# Patient Record
Sex: FEMALE | Race: OTHER | HISPANIC OR LATINO | Employment: UNEMPLOYED | URBAN - METROPOLITAN AREA
[De-identification: names, ages, dates, MRNs, and addresses within clinical notes are randomized per-mention and may not be internally consistent; named-entity substitution may affect disease eponyms.]

---

## 2023-06-15 ENCOUNTER — HOSPITAL ENCOUNTER (OUTPATIENT)
Facility: HOSPITAL | Age: 23
End: 2023-06-15
Attending: OBSTETRICS & GYNECOLOGY | Admitting: OBSTETRICS & GYNECOLOGY

## 2023-06-15 ENCOUNTER — HOSPITAL ENCOUNTER (EMERGENCY)
Facility: HOSPITAL | Age: 23
End: 2023-06-15
Attending: EMERGENCY MEDICINE | Admitting: EMERGENCY MEDICINE

## 2023-06-15 ENCOUNTER — HOSPITAL ENCOUNTER (OUTPATIENT)
Facility: HOSPITAL | Age: 23
Discharge: HOME | End: 2023-06-15
Attending: OBSTETRICS & GYNECOLOGY | Admitting: OBSTETRICS & GYNECOLOGY

## 2023-06-15 VITALS
DIASTOLIC BLOOD PRESSURE: 71 MMHG | TEMPERATURE: 98.8 F | HEART RATE: 71 BPM | SYSTOLIC BLOOD PRESSURE: 118 MMHG | RESPIRATION RATE: 16 BRPM

## 2023-06-15 VITALS
RESPIRATION RATE: 16 BRPM | WEIGHT: 146 LBS | TEMPERATURE: 97.9 F | DIASTOLIC BLOOD PRESSURE: 80 MMHG | SYSTOLIC BLOOD PRESSURE: 127 MMHG | OXYGEN SATURATION: 100 % | HEART RATE: 90 BPM

## 2023-06-15 DIAGNOSIS — R10.9 ABDOMINAL PAIN, UNSPECIFIED ABDOMINAL LOCATION: Primary | ICD-10-CM

## 2023-06-15 DIAGNOSIS — B96.89 BV (BACTERIAL VAGINOSIS): Primary | ICD-10-CM

## 2023-06-15 DIAGNOSIS — K59.00 CONSTIPATION: ICD-10-CM

## 2023-06-15 DIAGNOSIS — Z34.92 PRENATAL CARE IN SECOND TRIMESTER: ICD-10-CM

## 2023-06-15 DIAGNOSIS — N76.0 BV (BACTERIAL VAGINOSIS): Primary | ICD-10-CM

## 2023-06-15 DIAGNOSIS — Z34.92 PREGNANT AND NOT YET DELIVERED IN SECOND TRIMESTER: ICD-10-CM

## 2023-06-15 LAB
BACTERIA UR QL AUTO: ABNORMAL /HPF
BILIRUB UR QL STRIP: NEGATIVE
BILIRUB UR QL STRIP: NEGATIVE
CLARITY UR: ABNORMAL
CLARITY UR: CLEAR
COLOR UR: YELLOW
COLOR UR: YELLOW
GLUCOSE UR STRIP-MCNC: NEGATIVE MG/DL
GLUCOSE UR STRIP-MCNC: NEGATIVE MG/DL
HGB UR QL STRIP.AUTO: NEGATIVE
HGB UR QL STRIP.AUTO: NEGATIVE
KETONES UR STRIP-MCNC: NEGATIVE MG/DL
KETONES UR STRIP-MCNC: NEGATIVE MG/DL
LEUKOCYTE ESTERASE UR QL STRIP: ABNORMAL
LEUKOCYTE ESTERASE UR QL STRIP: ABNORMAL
NITRITE UR QL STRIP: NEGATIVE
NITRITE UR QL STRIP: NEGATIVE
NON-SQ EPI CELLS URNS QL MICRO: ABNORMAL /HPF
PH UR STRIP.AUTO: 7 [PH]
PH UR STRIP.AUTO: 7 [PH] (ref 4.5–8)
PROT UR STRIP-MCNC: NEGATIVE MG/DL
PROT UR STRIP-MCNC: NEGATIVE MG/DL
RBC #/AREA URNS AUTO: ABNORMAL /HPF
SP GR UR STRIP.AUTO: 1.01 (ref 1–1.03)
SP GR UR STRIP.AUTO: 1.02 (ref 1–1.03)
UROBILINOGEN UR QL STRIP.AUTO: 0.2 E.U./DL
UROBILINOGEN UR QL STRIP.AUTO: 0.2 E.U./DL
WBC #/AREA URNS AUTO: ABNORMAL /HPF

## 2023-06-15 PROCEDURE — 76817 TRANSVAGINAL US OBSTETRIC: CPT

## 2023-06-15 PROCEDURE — 87491 CHLMYD TRACH DNA AMP PROBE: CPT

## 2023-06-15 PROCEDURE — 81001 URINALYSIS AUTO W/SCOPE: CPT | Performed by: EMERGENCY MEDICINE

## 2023-06-15 PROCEDURE — 99214 OFFICE O/P EST MOD 30 MIN: CPT

## 2023-06-15 PROCEDURE — 81003 URINALYSIS AUTO W/O SCOPE: CPT | Performed by: EMERGENCY MEDICINE

## 2023-06-15 PROCEDURE — NC001 PR NO CHARGE: Performed by: OBSTETRICS & GYNECOLOGY

## 2023-06-15 PROCEDURE — 87086 URINE CULTURE/COLONY COUNT: CPT | Performed by: EMERGENCY MEDICINE

## 2023-06-15 PROCEDURE — 87591 N.GONORRHOEAE DNA AMP PROB: CPT

## 2023-06-15 RX ORDER — METRONIDAZOLE 500 MG/1
500 TABLET ORAL EVERY 12 HOURS SCHEDULED
Qty: 14 TABLET | Refills: 0 | Status: SHIPPED | OUTPATIENT
Start: 2023-06-15 | End: 2023-06-22

## 2023-06-15 RX ORDER — DOCUSATE SODIUM 100 MG/1
100 CAPSULE, LIQUID FILLED ORAL 2 TIMES DAILY
Qty: 30 CAPSULE | Refills: 2 | Status: SHIPPED | OUTPATIENT
Start: 2023-06-15

## 2023-06-15 NOTE — EMTALA/ACUTE CARE TRANSFER
[facility] EMERGENCY DEPARTMENT  [address] [facility] [address]  Dept: [phone]      EMTALA TRANSFER CONSENT    NAME Arie Hamilton                                         2000                              MRN [id number]    I have been informed of my rights regarding examination, treatment, and transfer   by Dr Mateus Avila MD    Benefits: Specialized equipment and/or services available at the receiving facility (Include comment)________________________ (ob)    Risks: Potential for delay in receiving treatment, Potential deterioration of medical condition, Increased discomfort during transfer      Consent for Transfer:  I acknowledge that my medical condition has been evaluated and explained to me by the emergency department physician or other qualified medical person and/or my attending physician, who has recommended that I be transferred to the service of  Accepting Physician: Dr Radha Alvarez at [address] Name, [address] : José Antonio Ray  The above potential benefits of such transfer, the potential risks associated with such transfer, and the probable risks of not being transferred have been explained to me, and I fully understand them  The doctor has explained that, in my case, the benefits of transfer outweigh the risks  I agree to be transferred  I authorize the performance of emergency medical procedures and treatments upon me in both transit and upon arrival at the receiving facility  Additionally, I authorize the release of any and all medical records to the receiving facility and request they be transported with me, if possible  I understand that the safest mode of transportation during a medical emergency is an ambulance and that the Hospital advocates the use of this mode of transport   Risks of traveling to the receiving facility by car, including absence of medical control, life sustaining equipment, such as oxygen, and medical personnel has been explained to me and I fully understand them  (ANEL CORRECT BOX BELOW)  [  ]  I consent to the stated transfer and to be transported by ambulance/helicopter  [  ]  I consent to the stated transfer, but refuse transportation by ambulance and accept full responsibility for my transportation by car  I understand the risks of non-ambulance transfers and I exonerate the Hospital and its staff from any deterioration in my condition that results from this refusal     X___________________________________________    DATE  06/15/23  TIME________  Signature of patient or legally responsible individual signing on patient behalf           RELATIONSHIP TO PATIENT_________________________          Provider Certification    NAME Jessie Varela                                         2000                              MRN [id number]    A medical screening exam was performed on the above named patient  Based on the examination:    Condition Necessitating Transfer There were no encounter diagnoses  Patient Condition: The patient has been stabilized such that within reasonable medical probability, no material deterioration of the patient condition or the condition of the unborn child(zach) is likely to result from the transfer    Reason for Transfer: Level of Care needed not available at this facility    [facility]   · Space available and qualified personnel available for treatment as acknowledged by    · Agreed to accept transfer and to provide appropriate medical treatment as acknowledged by       Dr Jax Bailey  · Appropriate medical records of the examination and treatment of the patient are provided at the time of transfer   [address] _______  · Transfer will be performed by qualified personnel from    and appropriate transfer equipment as required, including the use of necessary and appropriate life support measures      Provider Certification: I have examined the patient and explained the following risks and benefits of being transferred/refusing transfer to the patient/family:  General risk, such as traffic hazards, adverse weather conditions, rough terrain or turbulence, possible failure of equipment (including vehicle or aircraft), or consequences of actions of persons outside the control of the transport personnel, Unanticipated needs of medical equipment and personnel during transport, Risk of worsening condition      Based on these reasonable risks and benefits to the patient and/or the unborn child(zach), and based upon the information available at the time of the patient’s examination, I certify that the medical benefits reasonably to be expected from the provision of appropriate medical treatments at another medical facility outweigh the increasing risks, if any, to the individual’s medical condition, and in the case of labor to the unborn child, from effecting the transfer      X____________________________________________ DATE 06/15/23        TIME_______      ORIGINAL - SEND TO MEDICAL RECORDS   COPY - SEND WITH PATIENT DURING TRANSFER

## 2023-06-15 NOTE — ED NOTES
Called [facility], spoke with triage RN, gave report, no further questions at this time     Cesar Alves RN  06/15/23 9821

## 2023-06-15 NOTE — PROCEDURES
Jessie Varela, rogerio [id number] at 24w3d with an REGGIE of 10/2/2023, by Patient Reported, was seen at [address] for the following procedure(s): $Procedure Type: US - Transvaginal]                   Ultrasound Other  Cervical Length: 4 04  Funnel: No  Debris: No  Placenta Previa: No  Vasa Previa: No             Ultrasound Probe Disinfection    A transvaginal ultrasound was performed     Prior to use, disinfection was performed with High Level Disinfection Process (Trophon)    Sabiha Hitchcock MD  06/15/23  6:54 PM

## 2023-06-15 NOTE — ED PROVIDER NOTES
History  Chief Complaint   Patient presents with   • Abdominal Pain     Pt reports being 4 months pregnant, started with right lower abdominal pain this morning, denies n/d/v, no difficulty urinating     The patient reports several week history of right sided abdominal pain  She reports the pain was previously just with certain movements like standing up and moving; however, the patient has been happening randomly even when not moving since this morning  The pain comes and goes with complete resolution in between episodes of pain  She notes increased urinary frequency today, but no burning with urination  She denies back or flank pain  She denies fevers or chills  No leg swelling  She reports she has been getting prenatal care in [place] but moved to the area 2 weeks ago but has not seen a local provider yet  History provided by:  Significant other and patient   used: Yes (Patient refused Turkish interpretter and instead preferred to use her significant other for Turkish )        None       History reviewed  No pertinent past medical history  History reviewed  No pertinent surgical history  History reviewed  No pertinent family history  I have reviewed and agree with the history as documented  E-Cigarette/Vaping     E-Cigarette/Vaping Substances     Social History     Tobacco Use   • Smoking status: Never   • Smokeless tobacco: Never       Review of Systems   All other systems reviewed and are negative  Physical Exam  Physical Exam  Vitals and nursing note reviewed  Constitutional:       General: She is not in acute distress  Appearance: She is well-developed  HENT:      Head: Normocephalic and atraumatic  Eyes:      Conjunctiva/sclera: Conjunctivae normal    Cardiovascular:      Rate and Rhythm: Normal rate and regular rhythm  Heart sounds: No murmur heard  Pulmonary:      Effort: Pulmonary effort is normal  No respiratory distress        Breath sounds: Normal breath sounds  Abdominal:      Palpations: Abdomen is soft  Tenderness: There is abdominal tenderness (mild diffuse right sided tenderness most intense over right upper portion of gravid uterus  Gravid uterus with size consistent with dates ) in the right upper quadrant and right lower quadrant  There is no guarding or rebound  Musculoskeletal:         General: No swelling  Cervical back: Neck supple  Skin:     General: Skin is warm and dry  Capillary Refill: Capillary refill takes less than 2 seconds  Neurological:      Mental Status: She is alert  Psychiatric:         Mood and Affect: Mood normal          Vital Signs  ED Triage Vitals   Temperature Pulse Respirations Blood Pressure SpO2   06/15/23 1537 06/15/23 1536 06/15/23 1536 06/15/23 1536 06/15/23 1536   97 9 °F (36 6 °C) 90 16 127/80 100 %      Temp Source Heart Rate Source Patient Position - Orthostatic VS BP Location FiO2 (%)   06/15/23 1537 -- -- -- --   Oral          Pain Score       --                  Vitals:    06/15/23 1536   BP: 127/80   Pulse: 90         Visual Acuity      ED Medications  Medications - No data to display    Diagnostic Studies  Results Reviewed     Procedure Component Value Units Date/Time    UA w Reflex to Microscopic w Reflex to Culture [[id number]]  (Abnormal) Collected: 06/15/23 1607    Lab Status: Final result Specimen: Urine, Clean Catch Updated: 06/15/23 1616     Color, UA Yellow     Clarity, UA Slightly Cloudy     Specific Roxana, UA 1 010     pH, UA 7 0     Leukocytes, UA 2+     Nitrite, UA Negative     Protein, UA Negative mg/dl      Glucose, UA Negative mg/dl      Ketones, UA Negative mg/dl      Urobilinogen, UA 0 2 E U /dl      Bilirubin, UA Negative     Occult Blood, UA Negative     URINE COMMENT --    Urine Microscopic [[id number]] Collected: 06/15/23 1607    Lab Status:  In process Specimen: Urine, Clean Catch Updated: 06/15/23 1616    Urine culture [[id number]] Collected: 06/15/23 1607 Lab Status: In process Specimen: Urine, Clean Catch Updated: 06/15/23 1616                 No orders to display              Procedures  Procedures         ED Course  ED Course as of 06/15/23 1616   Thu Perez 15, 2023   1601 Case discussed with Dr Gladis Prieto who accepted patient for transfer to L&D triage  [id number] Ambulance offered to patient for transfer but they prefer travel by private vehicle  Will transfer patient for further evaluation and treatment  Medical Decision Making  Patient reports several week history of right sided abdominal pain during pregnancy now with increased frequency  No focal RLQ tenderness  Given chronicity and broad area of tenderness, low risk for appendicitis  Will transfer for ob/gyn evaluation due to patient being 24 weeks pregant with abdominal pain  Amount and/or Complexity of Data Reviewed  External Data Reviewed: notes  Details: I reviewed notes form 5/30/2023 from ob/gyn  Patient previously diagnosed with chlamydia during pregnancy and was treated  No other concerns at that time  Discussion of management or test interpretation with external provider(s): Case discussed with Dr Gladis Prieto        Disposition  Final diagnoses:   Abdominal pain, unspecified abdominal location   Pregnant and not yet delivered in second trimester     Time reflects when diagnosis was documented in both MDM as applicable and the Disposition within this note     Time User Action Codes Description Comment    6/15/2023  3:56 PM Saul Mt Add [R10 9] Abdominal pain, unspecified abdominal location     6/15/2023  3:56 PM Saul Mt Add [Z34 92] Pregnant and not yet delivered in second trimester       ED Disposition     ED Disposition   Transfer to Another Facility-In Network    Condition   --    Date/Time   Thu Perez 15, 2023  3:54 PM    Comment   Abhinav Denis should be transferred out to [facility] L&D Triage             MD Documentation    Flowsheet Row Most Recent Value   Patient Condition The patient has been stabilized such that within reasonable medical probability, no material deterioration of the patient condition or the condition of the unborn child(zach) is likely to result from the transfer   Reason for Transfer Level of Care needed not available at this facility   Benefits of Transfer Specialized equipment and/or services available at the receiving facility (Include comment)________________________  [ob]   Risks of Transfer Potential for delay in receiving treatment, Potential deterioration of medical condition, Increased discomfort during transfer   Accepting Physician Jeffrey Lewis MD, Dr   Provider Certification General risk, such as traffic hazards, adverse weather conditions, rough terrain or turbulence, possible failure of equipment (including vehicle or aircraft), or consequences of actions of persons outside the control of the transport personnel, Unanticipated needs of medical equipment and personnel during transport, Risk of worsening condition      RN Documentation    72 RuJeffrey Vaz      Follow-up Information     Follow up With Specialties Details Why Contact Info    OB Gyn Labor and Delivery Triage    [address], [address] [place] [address]          Patient's Medications    No medications on file       No discharge procedures on file      PDMP Review     None          ED Provider  Electronically Signed by           Ashley Cid MD  06/15/23 9616

## 2023-06-15 NOTE — PROGRESS NOTES
L&D Triage Note - OB/GYN  Daren Johnson 20 y o  female MRN: [id number]  Unit/Bed#:  TRIAGE 3- Encounter: [id number]      ASSESSMENT:    Daren Johnson is a 20 y o  Edmond Heading at 24w3d who presents for right sided abdominal pain  Pain is consistent with round-ligament pain  Wet mount positive for Clue Cells  Patient recently treated for Chlamydia with negative HEMANTH  Partner was not treated  PLAN:    1) r/o PTL   - Abdomen soft, nontender  - Speculum examination: cervical os is closed, no vaginal bleeding or pooling noted, white-yellow vaginal discharge noted  - Nitrazine negative, Slides positive for clue cells, negative for hyphae, trichomonads on microscopy  - TVUS revealed 4 04 cm CL  - UA positive for trace leuks, will follow-up urine culture  - Metronidazole 500mg BID x 7 days sent to pharmacy    2) Chlamydia  - GC/CT sent; will call patient with results  - Doxy 100mg BID x7days sent to pharmacy for partner  - Patient advised not to have intercourse until both treated    3) Continue routine prenatal care  - Patient recently moved from [address] [facility] for [address] [facility] provided to patient, advised to make an appointment in 2 weeks  - 28 week labs sent    4) Discharge from [facility] triage with  labor precautions    - Reviewed rupture of membranes, false vs true labor, decreased fetal movement, and vaginal bleeding   - Pt to call provider with any concerns   - Case discussed with Dr Martin Brought:    Daren Johnson 20 y o  Edmond Heading at 24w3d with an Estimated Date of Delivery: 10/2/23 who presents with right sided abdominal pain  She reports that when she was stepping over a low fence, she felt a pulling sensation on her right side  She has continued to feel a stabbing sensation approximately every 20 minutes  She reports mild vaginal irritation  She denies contractions, LOF, vaginal bleeding, and endorses good fetal movement       She previously received care in [place], but moved to [place] 2 weeks ago  She is looking to transfer her care       Her current obstetrical history is significant for single IUP    Her past obstetrical history is significant for one prior term vaginal delivery in [place]    Contractions: no  Leakage of fluid: no  Vaginal Bleeding: no  Fetal movement: present    OBJECTIVE:    Vitals:    06/15/23 1700   BP: 118/71   Pulse: 71   Resp: 16   Temp:        ROS:  Constitutional: Negative for fevers, chills, headaches, vision changes  Respiratory: Negative for shortness of breath, cough  Cardiovascular: Negative for chest pain, palpitations, lower extremity edema    Gastrointestinal: Negative for nausea, vomiting, diarrhea, constipation  :  Negative for dysuria, hematuria  EXTR:  Negative for rash, new myalgias/arthralgias, joint swelling      General Physical Exam:  General: in no apparent distress and well developed and well nourished  Cardiovascular: Cor RRR  Lungs: non-labored breathing  Abdomen: abdomen is soft without significant tenderness, masses, organomegaly or guarding  Lower extremeties: nontender    Cervical Exam  Speculum: Cervical os is not visibly dilated, no bleeding or lesions, white-yellow discharge from cervical os   SVE: 0 / 0% / -4    Fetal monitoring:  FHT:  135 bpm/ Moderate 6 - 25 bpm / 15 x 15 accelerations present, no decelerations  [place]: contractions not present     KOH/WTMT:     Infection:   - Positive clue cells    - no hyphae   - no trichomonads present    Membrane status   - no ferning   - no nitrazene   - no pooling     Urine Dip    - pos for trace leuks    Imaging:       TVUS   - Cervical length    - 4 07cm    - 4 10cm    - 4 04cm      Mian Garnett MD,  OBGYN PGY-1  6/15/2023 6:20 PM

## 2023-06-16 LAB
C TRACH DNA SPEC QL NAA+PROBE: NEGATIVE
N GONORRHOEA DNA SPEC QL NAA+PROBE: NEGATIVE

## 2023-06-17 LAB — BACTERIA UR CULT: NORMAL

## 2023-06-29 ENCOUNTER — APPOINTMENT (OUTPATIENT)
Dept: LAB | Facility: CLINIC | Age: 23
End: 2023-06-29

## 2023-06-29 ENCOUNTER — INITIAL PRENATAL (OUTPATIENT)
Dept: OBGYN CLINIC | Facility: CLINIC | Age: 23
End: 2023-06-29

## 2023-06-29 DIAGNOSIS — Z34.93 PRENATAL CARE IN THIRD TRIMESTER: Primary | ICD-10-CM

## 2023-06-29 DIAGNOSIS — Z34.92 PRENATAL CARE IN SECOND TRIMESTER: ICD-10-CM

## 2023-06-29 LAB
ERYTHROCYTE [DISTWIDTH] IN BLOOD BY AUTOMATED COUNT: 14.5 % (ref 11.6–15.1)
GLUCOSE 1H P 50 G GLC PO SERPL-MCNC: 156 MG/DL (ref 40–134)
HCT VFR BLD AUTO: 33.6 % (ref 34.8–46.1)
HGB BLD-MCNC: 11.1 G/DL (ref 11.5–15.4)
MCH RBC QN AUTO: 29.4 PG (ref 26.8–34.3)
MCHC RBC AUTO-ENTMCNC: 33 G/DL (ref 31.4–37.4)
MCV RBC AUTO: 89 FL (ref 82–98)
PLATELET # BLD AUTO: 205 THOUSANDS/UL (ref 149–390)
PMV BLD AUTO: 12.7 FL (ref 8.9–12.7)
RBC # BLD AUTO: 3.78 MILLION/UL (ref 3.81–5.12)
TREPONEMA PALLIDUM IGG+IGM AB [PRESENCE] IN SERUM OR PLASMA BY IMMUNOASSAY: NORMAL
WBC # BLD AUTO: 8.99 THOUSAND/UL (ref 4.31–10.16)

## 2023-06-29 PROCEDURE — 82950 GLUCOSE TEST: CPT

## 2023-06-29 PROCEDURE — 85027 COMPLETE CBC AUTOMATED: CPT

## 2023-06-29 PROCEDURE — 36415 COLL VENOUS BLD VENIPUNCTURE: CPT

## 2023-06-29 PROCEDURE — 86780 TREPONEMA PALLIDUM: CPT

## 2023-06-29 RX ORDER — ACETAMINOPHEN 325 MG/1
650 TABLET ORAL EVERY 6 HOURS PRN
COMMUNITY

## 2023-06-29 NOTE — PROGRESS NOTES
OB INTAKE INTERVIEW  Pt presents for OB intake    OB History    Para Term  AB Living   2 1 1   0 1   SAB IAB Ectopic Multiple Live Births   0 0 0 0 1      # Outcome Date GA Lbr Torrey/2nd Weight Sex Delivery Anes PTL Lv   2 Current            1 Term 2018    M Vag-Spont   PAGE     Hx of  delivery prior to 36 weeks 6 days:  No   If yes, place a referral for cervical surveillance at 16 weeks  Last Menstrual Period:    Patient's last menstrual period was 2022 (within months)  Ultrasound date: early pn care done in [place]  All records available in epic      Estimated Date of Delivery: 10/2/23   Confirmed by LMP or US    H&P visit scheduled  2023      Last pap smear: unknown date    Findings; lab pap smear results: no abnormalities    Current Issues:  Constipation :   Yes  Headaches :   No  Cramping:  No  Spotting :   No  PICA cravings :  No    FOB Involved:   Yes  Planned pregnancy:  Yes    I have these concerns about this prenatal patient:   Late transfer from [place]  All lab results/US available in [facility]  28 week labs ordered when patient was in L&D-- slips printed and patient to complete at end of intake  Intake done with use of   Interview education    • Baby and Me Handout  • Baby and Me [facility] Handout  • [facility] MFM Handout  • Discussed genetic testing  • Prenatal lab work: Scripts printed and given to pt  • Influenza vaccine given today: No  • Discussed Tdap vaccine  Immunizations:   Immunization History   Administered Date(s) Administered   • Influenza Injectable, MDCK, Preservative Free, Quadrivalent, 0 5 mL 2023   • Tdap 07/10/2018     Depression Screening Follow-up Plan: Patient's depression screening was negative with an [place] score of  0  Clinically patient does not have depression  No treatment is required             Infection Screening: Does the pt have a hx of MRSA?  No  If yes- please follow MRSA protocol and obtain a nasal swab for MRSA culture    The patient was oriented to our practice and all questions were answered    Interviewed by: Kyara Sultana RN 06/29/23

## 2023-06-29 NOTE — LETTER
[facility] Letter    Sabrina Caraballo  2000  [address] [address]       06/29/23          Sabrina Caraballo is a patient and under our care in our office  Yasmin Guido's Estimated Date of Delivery: 10/2/23  Any questions or concerns feel free to contact our office       Thank you,    Aurea  [id number] [facility]/[address]  [address] [facility]/[place]  [phone]   [facility]/[address]  [phone]

## 2023-06-29 NOTE — LETTER
Proof of Pregnancy Letter    Joaquim Mohr  2000  [address] [address]        06/29/23      Joaquim Mohr is a patient at our facility  Joaquim Onur Estimated Date of Delivery: 10/2/23       Any questions or concerns, please feel free to contact our office      Sincerely,     [facility]   [address],   [place], [address] [place] [address]   [phone]

## 2023-06-29 NOTE — LETTER
Work Letter    Chon Shelton  2000  [place] [address]    Dear Chon Shelton,      06/29/23        Your employee is a patient at [facility]  We recommend that all pregnant women:    1  Have a well-ventilated workspace  2  Wear low-heeled shoes  3  Work no more than 40 hours per week  4  Have a 15 minute break every 2 hours and at least 30 minutes for a meal break  5  Use good body mechanics by bending at your knees to avoid back strain and lift no more than 20 pounds without assistance  Will need assistance with lifting over 20 lbs  6  Have ready access to bathrooms and water  She may continue to work until her due date unless medical complications arise  We anticipate she may return to work in 6-8 weeks after delivery       Sincerely,    [facility]  [place] [address] [place] [address], [address] [place] [address] [place]  [phone]

## 2023-06-30 ENCOUNTER — TELEPHONE (OUTPATIENT)
Facility: HOSPITAL | Age: 23
End: 2023-06-30

## 2023-06-30 ENCOUNTER — TELEPHONE (OUTPATIENT)
Dept: PERINATAL CARE | Facility: OTHER | Age: 23
End: 2023-06-30

## 2023-06-30 NOTE — TELEPHONE ENCOUNTER
Called patient to schedule MFM appointment, based on referral issued to Maternal Fetal Medicine by [facility] office  Left voicemail with  requesting patient to call back and schedule appointment, with office number for return call [phone]

## 2023-07-03 ENCOUNTER — TELEPHONE (OUTPATIENT)
Facility: HOSPITAL | Age: 23
End: 2023-07-03

## 2023-07-03 ENCOUNTER — PATIENT OUTREACH (OUTPATIENT)
Dept: OBGYN CLINIC | Facility: CLINIC | Age: 23
End: 2023-07-03

## 2023-07-03 DIAGNOSIS — Z34.93 PRENATAL CARE IN THIRD TRIMESTER: ICD-10-CM

## 2023-07-03 DIAGNOSIS — R73.09 GLUCOSE TOLERANCE TEST ABNORMAL: Primary | ICD-10-CM

## 2023-07-03 NOTE — PROGRESS NOTES
CAILIN NGUYEN spoke with 21 y/o-S-G2:P1-  Salvadorean speaking woman for prenatal assessment. Pt resides with FOB and 3 y/o son. Pt reported pregnancy was planned and both are happy. Pt denies any usage of drug, alcohol, smoking. Pt denies any Mental Health or Domestic Violence Hx. Pt is applying for MA and WIC. Pt is a  and denies financial concerns. Pt denies transportation issues. Pt reported FOB and extended family are very supportive. Pt denies any questions or concerns at this time. CAILIN NGUYEN explained her role and provided contact information. Pt was encouraged to contact at any time needed.

## 2023-07-03 NOTE — TELEPHONE ENCOUNTER
Moved PT appt to an open spot for an individual visit. Called PT w/ and she is aware of the changes to her appt.

## 2023-07-03 NOTE — RESULT ENCOUNTER NOTE
Please inform patient of elevated 1hr GTT result. I have ordered a 3 hour glucose test for her to complete. Thank you!

## 2023-07-03 NOTE — TELEPHONE ENCOUNTER
----- Message from Heath Christopher sent at 7/3/2023  9:38 AM EDT -----  Regarding: Reschedule pt  Patient is Vincentian speaking and needs individual appointment. She's currently in the 2nd trimester class - Can someone please contact her to reschedule? Thanks!

## 2023-07-05 ENCOUNTER — TELEPHONE (OUTPATIENT)
Dept: OBGYN CLINIC | Facility: CLINIC | Age: 23
End: 2023-07-05

## 2023-07-05 NOTE — TELEPHONE ENCOUNTER
----- Message from Payam Arora MD sent at 7/3/2023  1:48 PM EDT -----  Please inform patient of elevated 1hr GTT result. I have ordered a 3 hour glucose test for her to complete. Thank you!

## 2023-07-13 ENCOUNTER — ROUTINE PRENATAL (OUTPATIENT)
Dept: OBGYN CLINIC | Facility: CLINIC | Age: 23
End: 2023-07-13

## 2023-07-13 VITALS
WEIGHT: 155 LBS | SYSTOLIC BLOOD PRESSURE: 113 MMHG | HEIGHT: 64 IN | DIASTOLIC BLOOD PRESSURE: 77 MMHG | BODY MASS INDEX: 26.46 KG/M2 | RESPIRATION RATE: 18 BRPM | HEART RATE: 76 BPM

## 2023-07-13 DIAGNOSIS — O99.810 ABNORMAL GLUCOSE TOLERANCE AFFECTING PREGNANCY, ANTEPARTUM: ICD-10-CM

## 2023-07-13 DIAGNOSIS — Z34.93 PRENATAL CARE IN THIRD TRIMESTER: Primary | ICD-10-CM

## 2023-07-13 RX ORDER — FAMOTIDINE 20 MG/1
20 TABLET, FILM COATED ORAL 2 TIMES DAILY
COMMUNITY
Start: 2023-03-30 | End: 2024-03-29

## 2023-07-13 RX ORDER — AZITHROMYCIN 250 MG/1
1000 TABLET, FILM COATED ORAL
COMMUNITY
Start: 2023-03-08

## 2023-07-13 NOTE — PROGRESS NOTES
Using  [phone], the patient presents at 28 weeks 3 days gestation for routine prenatal exam.  She has no complaints related to the pregnancy other than occasional hardness which she notices on palpation of her abdomen. There is no pattern to the hardness and does not feel like labor pain. She is made aware that if the tightening in her abdomen becomes anything other than random she should report to [facility] for evaluation. She denies decreased fetal movement, vaginal bleeding, loss of fluid, contractions, or pain. Consents were signed for delivery. She has occasional shortness of breath when ambulating on stairs, denies fever, chills, cough or any other accompanying symptoms. She had a history of a positive chlamydia test in the first trimester and was tested for reinfection at the end of May with a negative result, noted in care everywhere from [address] [place] [address]. We will see her back in 2 weeks or as needed.

## 2023-07-23 NOTE — PROGRESS NOTES
Please refer to the [facility] ultrasound report in Ob Procedures for additional information regarding today's visit

## 2023-07-24 ENCOUNTER — ROUTINE PRENATAL (OUTPATIENT)
Dept: PERINATAL CARE | Facility: OTHER | Age: 23
End: 2023-07-24

## 2023-07-24 VITALS
HEART RATE: 70 BPM | HEIGHT: 61 IN | DIASTOLIC BLOOD PRESSURE: 68 MMHG | WEIGHT: 153.6 LBS | BODY MASS INDEX: 29 KG/M2 | SYSTOLIC BLOOD PRESSURE: 118 MMHG

## 2023-07-24 DIAGNOSIS — Z36.4 ULTRASOUND FOR ANTENATAL SCREENING FOR FETAL GROWTH RESTRICTION: Primary | ICD-10-CM

## 2023-07-24 DIAGNOSIS — Z3A.30 30 WEEKS GESTATION OF PREGNANCY: ICD-10-CM

## 2023-07-24 DIAGNOSIS — Z34.93 PRENATAL CARE IN THIRD TRIMESTER: ICD-10-CM

## 2023-07-24 PROCEDURE — 99243 OFF/OP CNSLTJ NEW/EST LOW 30: CPT | Performed by: OBSTETRICS & GYNECOLOGY

## 2023-07-24 PROCEDURE — 76816 OB US FOLLOW-UP PER FETUS: CPT | Performed by: OBSTETRICS & GYNECOLOGY

## 2023-07-24 NOTE — LETTER
2023     [address] [facility] PROVIDER    Patient: Argentina Purcell   YOB: 2000   Date of Visit: 2023       Dear  Provider: Thank you for referring Argentina Purcell to me for evaluation. Below are my notes for this consultation. If you have questions, please do not hesitate to call me. I look forward to following your patient along with you.          Sincerely,         US 1 MO        CC: No Recipients    Yuko Zurita MD  2023  1:54 PM  Sign when Signing Visit  Please refer to the [facility] ultrasound report in Ob Procedures for additional information regarding today's visit

## 2023-07-24 NOTE — LETTER
July 24, 2023     [address] [facility] PROVIDER    Patient: Eduard Aguirre   YOB: 2000   Date of Visit: 7/24/2023       Dear  Provider: Thank you for referring Eduard Aguirre to me for evaluation. Below are my notes for this consultation. If you have questions, please do not hesitate to call me. I look forward to following your patient along with you.          Sincerely,        Shan Vincent MD        CC: No Recipients    Shan Vincent MD  7/23/2023  1:54 PM  Sign when Signing Visit  Please refer to the [facility] ultrasound report in Ob Procedures for additional information regarding today's visit

## 2023-07-24 NOTE — PATIENT INSTRUCTIONS
Conteo de patadas en el embarazo   LO QUE NECESITA SABER:   El conteo de patadas mide cuánto se está moviendo gutierres bebé en el útero. Baljinder patada de gutierres bebé podría sentirse denae baljinder torcedura, baljinder vuelta, un crujido, un meneo o un golpe. Es común sentir a tu bebé patear a las 32 a 29 semanas de [place]. Es posible que sienta al bebé patear ya a las 20 semanas de [place]. Puede que desee empezar a contar a las 28 semanas. INSTRUCCIONES SOBRE [facility]:   Comuníquese con gutierres médico de inmediato si:  Usted siente un cambio en el número de patadas o movimientos de gutierres bebé. Siente menos de 10 patadas en 2 horas. Usted tiene preguntas o inquietudes acerca de los movimientos de gutierres bebé. Por qué realizar el conteo de patadas: Los movimientos de gutierres bebé podrían proporcionar información de la montrell de gutierres bebé. Es posible que si hay problemas, gutierres bebé se mueva menos o nada en lo absoluto. El bebé podría moverse menos si no recibe suficiente oxígeno o alimento de la placenta. No fume mientras está embarazada. Fumar disminuye la cantidad de oxígeno que llega a gutierres bebé. Hable con gutierres médico si necesita ayuda para dejar de fumar. Los problemas que se encuentran en baljinder etapa más temprana son más fáciles de tratar. Cuándo realizar el conteo de patadas:  Cuente las patadas en el mismo horario todos los [place]. Realice el conteo de las patadas cuando gutierres bebé esté despierto y [place]. Gutierres bebé podría estar más activo en la tarde. Cómo realizar el conteo de patadas: Revise que gutierres bebé esté despierto antes de realizar el conteo de patadas. Usted puede despertar a gutierres bebé empujando gutierres estómago suavemente, caminando o tomando algo frío. Gutierres médico podría indicarle diferentes maneras de realizar el conteo. Es posible que le indique que wilfrido lo siguiente:  Use baljinder gráfica o un reloj para mantener un registro de la hora en que comienza y termina de contar.     Siéntese en baljinder silla o acuéstese en gutierres costado derecho. Coloque yaya kusum en la parte más stiven de gutierres [place]. Cuente hasta que llegue a las 10 patadas. Escriba cuánto tiempo le lleva contar las 10 patadas. Podría rakesh de 30 minutos a 2 horas para contar 10 patadas. No debería de rakesh más de 2 horas para contar 10 patadas. Acuda a la consulta de control con gutierres médico según las indicaciones: Anote yaya preguntas para que se acuerde de hacerlas soha yaya visitas. © Copyright [facility], [place] 2022 Information is for End User's use only and may not be sold, redistributed or otherwise used for commercial purposes. Esta información es sólo para uso en educación. Gutierres intención no es darle un consejo médico sobre enfermedades o tratamientos. Colsulte con gutierres Sinai Marchi farmacéutico antes de seguir cualquier régimen médico para saber si es seguro y efectivo para usted.

## 2023-07-27 ENCOUNTER — ROUTINE PRENATAL (OUTPATIENT)
Dept: OBGYN CLINIC | Facility: CLINIC | Age: 23
End: 2023-07-27

## 2023-07-27 VITALS
DIASTOLIC BLOOD PRESSURE: 77 MMHG | HEIGHT: 64 IN | SYSTOLIC BLOOD PRESSURE: 113 MMHG | BODY MASS INDEX: 25.95 KG/M2 | WEIGHT: 152 LBS | HEART RATE: 78 BPM | RESPIRATION RATE: 18 BRPM

## 2023-07-27 DIAGNOSIS — Z3A.30 30 WEEKS GESTATION OF PREGNANCY: ICD-10-CM

## 2023-07-27 DIAGNOSIS — A74.9 CHLAMYDIA INFECTION AFFECTING PREGNANCY IN SECOND TRIMESTER: ICD-10-CM

## 2023-07-27 DIAGNOSIS — Z34.92 PRENATAL CARE, SECOND TRIMESTER: ICD-10-CM

## 2023-07-27 DIAGNOSIS — O98.812 CHLAMYDIA INFECTION AFFECTING PREGNANCY IN SECOND TRIMESTER: ICD-10-CM

## 2023-07-27 DIAGNOSIS — Z23 NEED FOR VACCINATION: Primary | ICD-10-CM

## 2023-07-27 DIAGNOSIS — R73.09 ELEVATED GLUCOSE TOLERANCE TEST: ICD-10-CM

## 2023-07-27 PROCEDURE — 90715 TDAP VACCINE 7 YRS/> IM: CPT | Performed by: OBSTETRICS & GYNECOLOGY

## 2023-07-27 PROCEDURE — 99213 OFFICE O/P EST LOW 20 MIN: CPT | Performed by: OBSTETRICS & GYNECOLOGY

## 2023-07-27 PROCEDURE — 90471 IMMUNIZATION ADMIN: CPT | Performed by: OBSTETRICS & GYNECOLOGY

## 2023-07-27 NOTE — PROGRESS NOTES
[facility]  Name: Nieves Horta  MRN: [id number]  : 2000      ASSESSMENT/PLAN:  Problem List        Other    30 weeks gestation of pregnancy    Overview     -Prenatal labs wnl from prior   -Gonorrhea/chlamydia screening neg  -Level II US wnl  -Delivery plan is   -Delivery consent signed  -Contraception plan is Nexplanon  -Vaccinations: s/p tdap  -Return to office in 2 weeks           Prenatal care, second trimester    Overview     Transfer of care from [facility]           Elevated glucose tolerance test    Overview     1h GTT elevated  3h GTT ordered - pt reminded to complete         Chlamydia infection affecting pregnancy in second trimester    Overview     S/p treatment and negative HEMANTH        Other Visit Diagnoses     Need for vaccination    -  Primary          SUBJECTIVE 20 y.o. Tamsen Hash 30w3d here for PN visit. She denies contractions. She denies leakage of fluid and vaginal bleeding. She reports good fetal movement.      OBJECTIVE:  Vitals:    23 0810   BP: 113/77   Pulse: 78   Resp: 18       Physical Exam    Fundal height: 30 cm   FHT: 134 bpm       Future Appointments   Date Time Provider [address]   8/10/2023  8:15 AM Lexii Stevenson [facility] Charmel Hatchet [facility]         Carrie Salcedo MD  OB/GYN PGY-4  2023  8:56 AM

## 2023-08-09 PROBLEM — Z3A.32 32 WEEKS GESTATION OF PREGNANCY: Status: ACTIVE | Noted: 2023-07-27

## 2023-08-09 PROBLEM — Z34.93 PRENATAL CARE, THIRD TRIMESTER: Status: ACTIVE | Noted: 2023-07-27

## 2023-08-10 ENCOUNTER — ROUTINE PRENATAL (OUTPATIENT)
Dept: OBGYN CLINIC | Facility: CLINIC | Age: 23
End: 2023-08-10

## 2023-08-10 VITALS
HEIGHT: 64 IN | BODY MASS INDEX: 26.56 KG/M2 | DIASTOLIC BLOOD PRESSURE: 76 MMHG | WEIGHT: 155.6 LBS | SYSTOLIC BLOOD PRESSURE: 114 MMHG

## 2023-08-10 DIAGNOSIS — Z34.93 PRENATAL CARE, THIRD TRIMESTER: Primary | ICD-10-CM

## 2023-08-10 DIAGNOSIS — Z3A.32 32 WEEKS GESTATION OF PREGNANCY: ICD-10-CM

## 2023-08-10 DIAGNOSIS — Z78.9 LANGUAGE BARRIER: ICD-10-CM

## 2023-08-10 PROBLEM — Z75.8 LANGUAGE BARRIER: Status: ACTIVE | Noted: 2023-08-10

## 2023-08-10 PROBLEM — Z60.3 LANGUAGE BARRIER: Status: ACTIVE | Noted: 2023-08-10

## 2023-08-10 PROCEDURE — 99213 OFFICE O/P EST LOW 20 MIN: CPT | Performed by: NURSE PRACTITIONER

## 2023-08-10 NOTE — PROGRESS NOTES
[facility]  OB/GYN prenatal visit    S: 20 y.o. Blane Sleet here for PN visit. She has no obstetric complaints, including pelvic pain, contractions, vaginal bleeding, loss of fluid, or decreased fetal movement. She reports HA's and night, does not drink enough of water and drinks a lot of juice. She denies any visual changes.   Needs to complete her  3 hr GTT    O:  Vitals:    08/10/23 0821   BP: 114/76       Gen: no acute distress, nonlabored breathing  Fundal Height (cm): 33 cm  Fetal Heart Rate: 142    A/P:      IUP at 32w2d  No obstetric complaints today   hr gTT 156, needs to complete 3 hr GTT, hgb 11.1  US 23, EFW 26 %, no further US  Discussed  labor precautions and fetal kick counts    Return to clinic in 2 weeks    Problem List        Other    32 weeks gestation of pregnancy    Overview     -Prenatal labs wnl from prior   -Gonorrhea/chlamydia screening neg  -Level II US wnl  1 hr - needs to complete 3 hr GTT  -Delivery plan is   -Delivery consent signed  -Contraception plan is Nexplanon  -Vaccinations: s/p tdap  -Return to office in 2 weeks           Prenatal care, third trimester    Overview     Transfer [facility] care from [facility]           Elevated glucose tolerance test    Overview     1h GTT elevated  3h GTT ordered - pt reminded to complete         Chlamydia infection affecting pregnancy in second trimester    Overview     S/p treatment and negative HEMANTH                JOE Goins  8/10/2023  8:54 AM

## 2023-08-14 ENCOUNTER — HOSPITAL ENCOUNTER (OUTPATIENT)
Facility: HOSPITAL | Age: 23
Discharge: HOME | End: 2023-08-14
Attending: OBSTETRICS & GYNECOLOGY | Admitting: OBSTETRICS & GYNECOLOGY

## 2023-08-14 VITALS
HEART RATE: 79 BPM | OXYGEN SATURATION: 100 % | RESPIRATION RATE: 16 BRPM | TEMPERATURE: 98.1 F | SYSTOLIC BLOOD PRESSURE: 112 MMHG | DIASTOLIC BLOOD PRESSURE: 71 MMHG

## 2023-08-14 DIAGNOSIS — K59.00 CONSTIPATION DURING PREGNANCY: Primary | ICD-10-CM

## 2023-08-14 DIAGNOSIS — O99.619 CONSTIPATION DURING PREGNANCY: Primary | ICD-10-CM

## 2023-08-14 PROBLEM — Z3A.33 33 WEEKS GESTATION OF PREGNANCY: Status: ACTIVE | Noted: 2023-07-27

## 2023-08-14 PROCEDURE — NC001 PR NO CHARGE: Performed by: STUDENT IN AN ORGANIZED HEALTH CARE EDUCATION/TRAINING PROGRAM

## 2023-08-14 PROCEDURE — 87491 CHLMYD TRACH DNA AMP PROBE: CPT | Performed by: OBSTETRICS & GYNECOLOGY

## 2023-08-14 PROCEDURE — 99213 OFFICE O/P EST LOW 20 MIN: CPT

## 2023-08-14 PROCEDURE — 87591 N.GONORRHOEAE DNA AMP PROB: CPT | Performed by: OBSTETRICS & GYNECOLOGY

## 2023-08-14 PROCEDURE — 76817 TRANSVAGINAL US OBSTETRIC: CPT | Performed by: OBSTETRICS & GYNECOLOGY

## 2023-08-14 RX ORDER — POLYETHYLENE GLYCOL 3350 17 G/17G
17 POWDER, FOR SOLUTION ORAL DAILY
Qty: 10 EACH | Refills: 0 | Status: SHIPPED | OUTPATIENT
Start: 2023-08-14

## 2023-08-14 NOTE — PROGRESS NOTES
Triage Note - OB  Fredrick Younger 20 y.o. female MRN: [id number]  Unit/Bed#:  TRIAGE 2-01 Encounter: [id number]    OB TRIAGE NOTE  Fredrick Younger  [id number]  2023  9:22 AM  LD TRIAGE 2/LD TRIAGE 2-*    ASSESS:  20 y.o.  33w0d with abdominal/pelvic pressure. PLAN  #1. Abdominal/pelvic pressure: r/o PTL  · Constant downward pressure since last night  · Denies VB, LOF; endorses good FM  · Has not had bowel movement since Friday  · TVUS cervical length: 2.52 cm  · Chlamydia/GC: collected  · SVE: 3   · Betamethasone was considered but given this patient will likely not deliver within a week, she was given strict return precautions. #2. IUP at 65G2U  · Complicated by chlamydia infection; Neg HEMANTH 6/15/2023  · Re-swab collected today  · 1 hr GTT elevated (156); needs to complete 3 hr     Discharge instructions  · Patient instructed to call if experiencing worsening contractions, vaginal bleeding, loss of fluid or decreased fetal movement. · Will follow up with OBGYN on 2023. D/w Dr. Milena Thibodeaux  ______________    SUBJECTIVE    REGGIE: 10/2/23    HPI Chronology:  20 y.o.  33w0d presents with complaint of constant downward abdominal and pelvic pressure since last night. Denies vaginal bleeding, loss of fluid; endorses good fetal movement. States that the pressure/pain can worsen at times, although she is not sure how often it feels worse or anything that makes it worse or better. She has not recently had intercourse. Has not had a bowel movement since Friday. Contractions: yes; irregular  Leakage: no  Bleeding: no  Fetal Movement: good/active  Pelvic pain: constant pressure    Vitals:   /71   Pulse 79   Temp 98.1 °F (36.7 °C) (Oral)   Resp 16   LMP 2022 (Within Months)   SpO2 100%   There is no height or weight on file to calculate BMI. Review of Systems   Constitutional: Negative for chills and fever. Eyes: Negative for visual disturbance.    Respiratory: Negative for chest tightness and shortness of breath. Cardiovascular: Negative for chest pain, palpitations and leg swelling. Gastrointestinal: Positive for abdominal pain. Genitourinary: Positive for pelvic pain (pressure). Negative for vaginal bleeding and vaginal pain. Neurological: Negative for headaches. Physical Exam  Vitals reviewed. Constitutional:       Appearance: Normal appearance. HENT:      Head: Normocephalic. Cardiovascular:      Rate and Rhythm: Normal rate. Pulmonary:      Effort: Pulmonary effort is normal.   Abdominal:      Tenderness: There is no abdominal tenderness. There is no guarding. Genitourinary:     Comments: External genitalia normal appearing; physiologic discharge present in vaginal canal; cervix visualized and normal appearing; no lacerations or lesions noted; no pooling of fluid noted  SVE: 1/70/-3 per Dr. Royal Gamboa  Skin:     General: Skin is warm and dry. Neurological:      Mental Status: She is alert and oriented to person, place, and time. Psychiatric:         Mood and Affect: Mood normal.         Behavior: Behavior normal.         Thought Content: Thought content normal.         Judgment: Judgment normal.         FHT:  Baseline: 120 bpm  Variability: moderate  Accelerations: present   Decelerations: absent     TOCO: irregular       Labs: No results found for this or any previous visit (from the past 24 hour(s)). Microscopic Slides: negative for ferning, clue cells, hyphae     Imaging:  TVUS: Cervical Length   -2.57 cm   -2.66 cm   -2.52 cm   -2.31 cm with suprapubic pressure  Fetal presentation: vertex      Lul Gamboa, [address] [facility] Gynecology PGY-4  8/14/2023  9:22 AM

## 2023-08-14 NOTE — PROCEDURES
rogerio Gutierrez [id number] at 33w0d with an REGGIE of 10/2/2023, by Patient Reported, was seen at [address] [place] [address] for the following procedure(s): $Procedure Type: US - Transvaginal]                   Ultrasound Other  Fetal Presentation: Vertex  Cervical Length: 2.52  Funnel: No  Placenta Previa: No  Vasa Previa: No        Maria De Jesus Thomas DO  08/14/23  7:58 AM

## 2023-08-15 LAB
C TRACH DNA SPEC QL NAA+PROBE: NEGATIVE
N GONORRHOEA DNA SPEC QL NAA+PROBE: NEGATIVE

## 2023-08-24 ENCOUNTER — ROUTINE PRENATAL (OUTPATIENT)
Dept: OBGYN CLINIC | Facility: CLINIC | Age: 23
End: 2023-08-24

## 2023-08-24 VITALS
BODY MASS INDEX: 26.63 KG/M2 | HEART RATE: 60 BPM | RESPIRATION RATE: 18 BRPM | HEIGHT: 64 IN | SYSTOLIC BLOOD PRESSURE: 119 MMHG | DIASTOLIC BLOOD PRESSURE: 75 MMHG | WEIGHT: 156 LBS

## 2023-08-24 DIAGNOSIS — Z34.93 PRENATAL CARE, THIRD TRIMESTER: Primary | ICD-10-CM

## 2023-08-24 DIAGNOSIS — R73.09 ELEVATED GLUCOSE TOLERANCE TEST: ICD-10-CM

## 2023-08-24 DIAGNOSIS — J45.909 ASTHMA DURING PREGNANCY: ICD-10-CM

## 2023-08-24 DIAGNOSIS — Z3A.34 34 WEEKS GESTATION OF PREGNANCY: ICD-10-CM

## 2023-08-24 DIAGNOSIS — A74.9 CHLAMYDIA INFECTION AFFECTING PREGNANCY IN SECOND TRIMESTER: ICD-10-CM

## 2023-08-24 DIAGNOSIS — O99.519 ASTHMA DURING PREGNANCY: ICD-10-CM

## 2023-08-24 DIAGNOSIS — O98.812 CHLAMYDIA INFECTION AFFECTING PREGNANCY IN SECOND TRIMESTER: ICD-10-CM

## 2023-08-24 PROCEDURE — 99213 OFFICE O/P EST LOW 20 MIN: CPT | Performed by: OBSTETRICS & GYNECOLOGY

## 2023-08-24 RX ORDER — ALBUTEROL SULFATE 90 UG/1
2 AEROSOL, METERED RESPIRATORY (INHALATION) EVERY 6 HOURS PRN
Qty: 8.5 G | Refills: 0 | Status: SHIPPED | OUTPATIENT
Start: 2023-08-24

## 2023-08-24 NOTE — PROGRESS NOTES
OB/GYN prenatal visit    [employer]  #[id number] used for visit translation    S: 20 y.o.  34w3d here for PN visit. She denies contractions, vaginal bleeding, loss of fluid, or decreased fetal movement. Was seen at L&D triage  for r/o PTL, SVE /-3. She is still reporting similar "downward pressure" that she was reporting at triage. Reports increased urinary frequency and constipation. Worsening shortness of breath over the last few days worse from baseline. She reports she feels like she is "choking" and has a lot of coughing. States her mother told her she had childhood asthma but has not been evaluated or treated since  O:  Vitals:    23 0855   BP: 119/75   Pulse: 60   Resp: 18       Blood Pressure: 119/75  Wt=70.8 kg (156 lb); Body mass index is 26.78 kg/m².; TWG=Not found. Fetal Heart Rate: 145; Fundal Height (cm): 34 cm     Gen: no acute distress, nonlabored breathing  Lungs: CTAB, no wheezing  Abdomen: gravid, nontender  SVE /-3  Fundal Height (cm): 34 cm  Fetal Heart Rate: 145      A/P:    Problem List Items Addressed This Visit        Respiratory    Asthma during pregnancy     Possible childhood asthma not currently using inhaler  Albuterol ventolin inhaler prn  Reviewed precautions - worsening SOB, choking feeling that would warrant ED evaluation         Relevant Medications    albuterol (ProAir HFA) 90 mcg/act inhaler       Other    34 weeks gestation of pregnancy     Patient still reporting pelvic pressure. SVE /-3, unchanged from prior on 23  No further ultrasounds indicated at this time  Contraception: Nexplanon  Discussed  labor precautions and fetal kick counts    Return to clinic in 2 weeks           Prenatal care, third trimester - Primary    Elevated glucose tolerance test     Reminded again to complete 3h gtt.  Reviewed lab locations         Chlamydia infection affecting pregnancy in second trimester     S/p treatment and negative HEMANTH, collected 8/14/23            Ysabel Yang MD  8/24/2023  9:49 AM

## 2023-08-24 NOTE — ASSESSMENT & PLAN NOTE
Patient still reporting pelvic pressure.  SVE /-3, unchanged from prior on 23  No further ultrasounds indicated at this time  Contraception: Nexplanon  Discussed  labor precautions and fetal kick counts    Return to clinic in 2 weeks

## 2023-08-24 NOTE — ASSESSMENT & PLAN NOTE
Possible childhood asthma not currently using inhaler  Albuterol ventolin inhaler prn  Reviewed precautions - worsening SOB, choking feeling that would warrant ED evaluation

## 2023-09-05 ENCOUNTER — APPOINTMENT (EMERGENCY)
Dept: RADIOLOGY | Facility: HOSPITAL | Age: 23
End: 2023-09-05
Payer: COMMERCIAL

## 2023-09-05 ENCOUNTER — APPOINTMENT (OUTPATIENT)
Dept: LAB | Facility: CLINIC | Age: 23
End: 2023-09-05
Payer: COMMERCIAL

## 2023-09-05 ENCOUNTER — HOSPITAL ENCOUNTER (OUTPATIENT)
Facility: HOSPITAL | Age: 23
Setting detail: OBSERVATION
Discharge: HOME | End: 2023-09-06
Attending: OBSTETRICS & GYNECOLOGY | Admitting: OBSTETRICS & GYNECOLOGY
Payer: COMMERCIAL

## 2023-09-05 DIAGNOSIS — A41.9 SEPSIS (HCC): ICD-10-CM

## 2023-09-05 DIAGNOSIS — R07.9 ACUTE CHEST PAIN: ICD-10-CM

## 2023-09-05 DIAGNOSIS — Z3A.30 30 WEEKS GESTATION OF PREGNANCY: ICD-10-CM

## 2023-09-05 DIAGNOSIS — R50.9 FEVER: Primary | ICD-10-CM

## 2023-09-05 LAB
ABO GROUP BLD: NORMAL
ALBUMIN SERPL BCP-MCNC: 3.6 G/DL (ref 3.5–5)
ALP SERPL-CCNC: 184 U/L (ref 34–104)
ALT SERPL W P-5'-P-CCNC: 9 U/L (ref 7–52)
ANION GAP SERPL CALCULATED.3IONS-SCNC: 6 MMOL/L
APTT PPP: 29 SECONDS (ref 23–37)
AST SERPL W P-5'-P-CCNC: 17 U/L (ref 13–39)
BACTERIA UR QL AUTO: ABNORMAL /HPF
BASOPHILS # BLD MANUAL: 0 THOUSAND/UL (ref 0–0.1)
BASOPHILS NFR MAR MANUAL: 0 % (ref 0–1)
BILIRUB SERPL-MCNC: 0.37 MG/DL (ref 0.2–1)
BILIRUB UR QL STRIP: NEGATIVE
BLD GP AB SCN SERPL QL: NEGATIVE
BUN SERPL-MCNC: 5 MG/DL (ref 5–25)
CALCIUM SERPL-MCNC: 8.5 MG/DL (ref 8.4–10.2)
CARDIAC TROPONIN I PNL SERPL HS: <2 NG/L
CHLORIDE SERPL-SCNC: 109 MMOL/L (ref 96–108)
CLARITY UR: CLEAR
CO2 SERPL-SCNC: 20 MMOL/L (ref 21–32)
COLOR UR: YELLOW
CREAT SERPL-MCNC: 0.49 MG/DL (ref 0.6–1.3)
CREAT UR-MCNC: 41.5 MG/DL
EOSINOPHIL # BLD MANUAL: 0 THOUSAND/UL (ref 0–0.4)
EOSINOPHIL NFR BLD MANUAL: 0 % (ref 0–6)
ERYTHROCYTE [DISTWIDTH] IN BLOOD BY AUTOMATED COUNT: 14.7 % (ref 11.6–15.1)
FLUAV RNA RESP QL NAA+PROBE: NEGATIVE
FLUBV RNA RESP QL NAA+PROBE: NEGATIVE
GFR SERPL CREATININE-BSD FRML MDRD: 137 ML/MIN/1.73SQ M
GLUCOSE 1H P 100 G GLC PO SERPL-MCNC: 158 MG/DL (ref 65–179)
GLUCOSE 2H P 100 G GLC PO SERPL-MCNC: 111 MG/DL (ref 65–154)
GLUCOSE 3H P 100 G GLC PO SERPL-MCNC: 102 MG/DL (ref 65–139)
GLUCOSE P FAST SERPL-MCNC: 88 MG/DL (ref 65–94)
GLUCOSE SERPL-MCNC: 107 MG/DL (ref 65–140)
GLUCOSE UR STRIP-MCNC: NEGATIVE MG/DL
HCT VFR BLD AUTO: 34.5 % (ref 34.8–46.1)
HGB BLD-MCNC: 11.3 G/DL (ref 11.5–15.4)
HGB UR QL STRIP.AUTO: NEGATIVE
INR PPP: 1 (ref 0.84–1.19)
KETONES UR STRIP-MCNC: NEGATIVE MG/DL
LACTATE SERPL-SCNC: 1.3 MMOL/L (ref 0.5–2)
LEUKOCYTE ESTERASE UR QL STRIP: ABNORMAL
LYMPHOCYTES # BLD AUTO: 0.32 THOUSAND/UL (ref 0.6–4.47)
LYMPHOCYTES # BLD AUTO: 3 % (ref 14–44)
MCH RBC QN AUTO: 28.4 PG (ref 26.8–34.3)
MCHC RBC AUTO-ENTMCNC: 32.8 G/DL (ref 31.4–37.4)
MCV RBC AUTO: 87 FL (ref 82–98)
MONOCYTES # BLD AUTO: 0.53 THOUSAND/UL (ref 0–1.22)
MONOCYTES NFR BLD: 5 % (ref 4–12)
MUCOUS THREADS UR QL AUTO: ABNORMAL
NEUTROPHILS # BLD MANUAL: 9.83 THOUSAND/UL (ref 1.85–7.62)
NEUTS SEG NFR BLD AUTO: 92 % (ref 43–75)
NITRITE UR QL STRIP: NEGATIVE
NON-SQ EPI CELLS URNS QL MICRO: ABNORMAL /HPF
PH UR STRIP.AUTO: 6.5 [PH]
PLATELET # BLD AUTO: 141 THOUSANDS/UL (ref 149–390)
PLATELET BLD QL SMEAR: ABNORMAL
PMV BLD AUTO: 12.6 FL (ref 8.9–12.7)
POTASSIUM SERPL-SCNC: 3.6 MMOL/L (ref 3.5–5.3)
PROCALCITONIN SERPL-MCNC: 0.09 NG/ML
PROT SERPL-MCNC: 6.6 G/DL (ref 6.4–8.4)
PROT UR STRIP-MCNC: ABNORMAL MG/DL
PROT UR-MCNC: 5 MG/DL
PROT/CREAT UR: 0.12 MG/G{CREAT} (ref 0–0.1)
PROTHROMBIN TIME: 13.8 SECONDS (ref 11.6–14.5)
RBC # BLD AUTO: 3.98 MILLION/UL (ref 3.81–5.12)
RBC #/AREA URNS AUTO: ABNORMAL /HPF
RBC MORPH BLD: NORMAL
RH BLD: POSITIVE
RSV RNA RESP QL NAA+PROBE: NEGATIVE
SARS-COV-2 RNA RESP QL NAA+PROBE: NEGATIVE
SODIUM SERPL-SCNC: 135 MMOL/L (ref 135–147)
SP GR UR STRIP.AUTO: 1.02 (ref 1–1.03)
SPECIMEN EXPIRATION DATE: NORMAL
UROBILINOGEN UR STRIP-ACNC: <2 MG/DL
WBC # BLD AUTO: 10.68 THOUSAND/UL (ref 4.31–10.16)
WBC #/AREA URNS AUTO: ABNORMAL /HPF

## 2023-09-05 PROCEDURE — 80053 COMPREHEN METABOLIC PANEL: CPT | Performed by: EMERGENCY MEDICINE

## 2023-09-05 PROCEDURE — 83605 ASSAY OF LACTIC ACID: CPT | Performed by: EMERGENCY MEDICINE

## 2023-09-05 PROCEDURE — 86850 RBC ANTIBODY SCREEN: CPT | Performed by: OBSTETRICS & GYNECOLOGY

## 2023-09-05 PROCEDURE — 71046 X-RAY EXAM CHEST 2 VIEWS: CPT

## 2023-09-05 PROCEDURE — 82570 ASSAY OF URINE CREATININE: CPT

## 2023-09-05 PROCEDURE — 99215 OFFICE O/P EST HI 40 MIN: CPT

## 2023-09-05 PROCEDURE — 99284 EMERGENCY DEPT VISIT MOD MDM: CPT

## 2023-09-05 PROCEDURE — 85007 BL SMEAR W/DIFF WBC COUNT: CPT | Performed by: EMERGENCY MEDICINE

## 2023-09-05 PROCEDURE — 93005 ELECTROCARDIOGRAM TRACING: CPT

## 2023-09-05 PROCEDURE — 86900 BLOOD TYPING SEROLOGIC ABO: CPT | Performed by: OBSTETRICS & GYNECOLOGY

## 2023-09-05 PROCEDURE — 87150 DNA/RNA AMPLIFIED PROBE: CPT | Performed by: OBSTETRICS & GYNECOLOGY

## 2023-09-05 PROCEDURE — NC001 PR NO CHARGE: Performed by: OBSTETRICS & GYNECOLOGY

## 2023-09-05 PROCEDURE — 86901 BLOOD TYPING SEROLOGIC RH(D): CPT | Performed by: OBSTETRICS & GYNECOLOGY

## 2023-09-05 PROCEDURE — 87086 URINE CULTURE/COLONY COUNT: CPT | Performed by: PHYSICIAN ASSISTANT

## 2023-09-05 PROCEDURE — 0241U HB NFCT DS VIR RESP RNA 4 TRGT: CPT | Performed by: EMERGENCY MEDICINE

## 2023-09-05 PROCEDURE — 84484 ASSAY OF TROPONIN QUANT: CPT | Performed by: EMERGENCY MEDICINE

## 2023-09-05 PROCEDURE — 99285 EMERGENCY DEPT VISIT HI MDM: CPT | Performed by: EMERGENCY MEDICINE

## 2023-09-05 PROCEDURE — 82952 GTT-ADDED SAMPLES: CPT

## 2023-09-05 PROCEDURE — 84145 PROCALCITONIN (PCT): CPT | Performed by: EMERGENCY MEDICINE

## 2023-09-05 PROCEDURE — 85730 THROMBOPLASTIN TIME PARTIAL: CPT | Performed by: EMERGENCY MEDICINE

## 2023-09-05 PROCEDURE — 76815 OB US LIMITED FETUS(S): CPT

## 2023-09-05 PROCEDURE — 85610 PROTHROMBIN TIME: CPT | Performed by: EMERGENCY MEDICINE

## 2023-09-05 PROCEDURE — 82951 GLUCOSE TOLERANCE TEST (GTT): CPT

## 2023-09-05 PROCEDURE — 96365 THER/PROPH/DIAG IV INF INIT: CPT

## 2023-09-05 PROCEDURE — 96375 TX/PRO/DX INJ NEW DRUG ADDON: CPT

## 2023-09-05 PROCEDURE — 96361 HYDRATE IV INFUSION ADD-ON: CPT

## 2023-09-05 PROCEDURE — 36415 COLL VENOUS BLD VENIPUNCTURE: CPT

## 2023-09-05 PROCEDURE — 96360 HYDRATION IV INFUSION INIT: CPT

## 2023-09-05 PROCEDURE — 84156 ASSAY OF PROTEIN URINE: CPT

## 2023-09-05 PROCEDURE — 81001 URINALYSIS AUTO W/SCOPE: CPT | Performed by: PHYSICIAN ASSISTANT

## 2023-09-05 PROCEDURE — 87040 BLOOD CULTURE FOR BACTERIA: CPT | Performed by: EMERGENCY MEDICINE

## 2023-09-05 PROCEDURE — 85027 COMPLETE CBC AUTOMATED: CPT | Performed by: EMERGENCY MEDICINE

## 2023-09-05 RX ORDER — METOCLOPRAMIDE HYDROCHLORIDE 5 MG/ML
10 INJECTION INTRAMUSCULAR; INTRAVENOUS EVERY 6 HOURS PRN
Status: DISCONTINUED | OUTPATIENT
Start: 2023-09-05 | End: 2023-09-06 | Stop reason: HOSPADM

## 2023-09-05 RX ORDER — ACETAMINOPHEN 325 MG/1
325 TABLET ORAL EVERY 6 HOURS PRN
Status: DISCONTINUED | OUTPATIENT
Start: 2023-09-05 | End: 2023-09-05

## 2023-09-05 RX ORDER — SODIUM CHLORIDE 9 MG/ML
INJECTION, SOLUTION INTRAVENOUS CONTINUOUS
Status: CANCELLED | OUTPATIENT
Start: 2023-09-05

## 2023-09-05 RX ORDER — SODIUM CHLORIDE 9 MG/ML
125 INJECTION, SOLUTION INTRAVENOUS CONTINUOUS
Status: DISCONTINUED | OUTPATIENT
Start: 2023-09-05 | End: 2023-09-05

## 2023-09-05 RX ORDER — CALCIUM CARBONATE 500 MG/1
1000 TABLET, CHEWABLE ORAL DAILY PRN
Status: DISCONTINUED | OUTPATIENT
Start: 2023-09-05 | End: 2023-09-06 | Stop reason: HOSPADM

## 2023-09-05 RX ORDER — ONDANSETRON 2 MG/ML
4 INJECTION INTRAMUSCULAR; INTRAVENOUS EVERY 8 HOURS PRN
Status: DISCONTINUED | OUTPATIENT
Start: 2023-09-05 | End: 2023-09-06 | Stop reason: HOSPADM

## 2023-09-05 RX ORDER — ACETAMINOPHEN 325 MG/1
650 TABLET ORAL EVERY 4 HOURS PRN
Status: DISCONTINUED | OUTPATIENT
Start: 2023-09-05 | End: 2023-09-06

## 2023-09-05 RX ORDER — METOCLOPRAMIDE HYDROCHLORIDE 5 MG/ML
10 INJECTION INTRAMUSCULAR; INTRAVENOUS ONCE
Status: COMPLETED | OUTPATIENT
Start: 2023-09-05 | End: 2023-09-05

## 2023-09-05 RX ORDER — SIMETHICONE 80 MG
80 TABLET,CHEWABLE ORAL 4 TIMES DAILY PRN
Status: DISCONTINUED | OUTPATIENT
Start: 2023-09-05 | End: 2023-09-06 | Stop reason: HOSPADM

## 2023-09-05 RX ORDER — ACETAMINOPHEN 325 MG/1
650 TABLET ORAL ONCE
Status: COMPLETED | OUTPATIENT
Start: 2023-09-05 | End: 2023-09-05

## 2023-09-05 RX ORDER — SODIUM CHLORIDE 9 MG/ML
125 INJECTION, SOLUTION INTRAVENOUS CONTINUOUS
Status: DISCONTINUED | OUTPATIENT
Start: 2023-09-05 | End: 2023-09-06 | Stop reason: HOSPADM

## 2023-09-05 RX ADMIN — SODIUM CHLORIDE 125 ML/HR: 0.9 INJECTION, SOLUTION INTRAVENOUS at 20:24

## 2023-09-05 RX ADMIN — SODIUM CHLORIDE 1000 ML: 0.9 INJECTION, SOLUTION INTRAVENOUS at 16:10

## 2023-09-05 RX ADMIN — METOCLOPRAMIDE HYDROCHLORIDE 10 MG: 5 INJECTION INTRAMUSCULAR; INTRAVENOUS at 16:16

## 2023-09-05 RX ADMIN — ACETAMINOPHEN 650 MG: 325 TABLET, FILM COATED ORAL at 16:16

## 2023-09-05 RX ADMIN — SODIUM CHLORIDE 1000 ML: 0.9 INJECTION, SOLUTION INTRAVENOUS at 23:05

## 2023-09-05 RX ADMIN — ACETAMINOPHEN 650 MG: 325 TABLET, FILM COATED ORAL at 21:42

## 2023-09-05 RX ADMIN — SODIUM CHLORIDE 1000 ML: 0.9 INJECTION, SOLUTION INTRAVENOUS at 19:10

## 2023-09-05 RX ADMIN — CEFTRIAXONE SODIUM 1000 MG: 10 INJECTION, POWDER, FOR SOLUTION INTRAVENOUS at 16:10

## 2023-09-05 NOTE — ASSESSMENT & PLAN NOTE
WBC 10.7 on admit  TMax 101.6, resolved  Tachycardia improved with IV fluid hydration  Fetal tachycardia noted in the 180s. Improved with fluids. Continous monitoring.   Neg covid/flu/RSV, UA wnl  Strep A culture to be collected  Blood, Urine cultures pending  VIOLET 9cm

## 2023-09-05 NOTE — PROGRESS NOTES
[address] 20 y.o. female MRN: [id number]  Unit/Bed#: [facility] Encounter: [id number]      Assessment/Plan:    Robbie Deluca is a 20 y.o. Tito Wilsony at 36w1d admitted for fever and monitoring. SVE: Cervical Dilation: 1  Cervical Effacement: 50  Cervical Consistency: Medium  Fetal Station: -3  Presentation: Vertex  Position: Unknown  Method: Manual  OB Examiner: Gissell    * Fever  Assessment & Plan  Fluid bolus, infusion  Continous monitoring  Neg covid, flu  Blood cultures pending  VIOLET 9cm             Patient of: [address] [place] [address]  This patient will be an OBSERVATION and I certify the anticipated length of stay is <2 Midnights  Discussed with Dr. Marcel Delgado:    Chief Complaint: fever, chills, back pain    HPI: Fidelia Shaikh is a 20 y.o. Tito Benitez with an REGGIE of 10/2/2023, by Patient Reported at 36w1d who is being admitted for fevers of unknown origin. She complains of fever, has no LOF, and reports no VB. She states she has felt good FM. Reports back pain since yesterday. All other review of systems is negative. Pregnancy Plan:  Pregnancy: Reinoso     Delivery Plans  Planned delivery method: Vaginal  Planned anesthesia: None  Acceptable blood products: All     Post-Delivery Plans  Feeding intentions: Non-human milk substitute      Patient Active Problem List   Diagnosis   • 34 weeks gestation of pregnancy   • Prenatal care, third trimester   • Elevated glucose tolerance test   • Chlamydia infection affecting pregnancy in second trimester   • Language barrier   • Asthma during pregnancy   • Fever       OB History    Para Term  AB Living   2 1 1   0 1   SAB IAB Ectopic Multiple Live Births   0 0 0 0 1      # Outcome Date GA Lbr [place]/2nd Weight Sex Delivery Anes PTL Lv   2 Current            1 Term 2018    M Vag-Spont   PAGE       History reviewed. No pertinent past medical history. History reviewed. No pertinent surgical history.     Social History     Tobacco Use   • Smoking status: Never     Passive exposure: Past   • Smokeless tobacco: Never   Substance Use Topics   • Alcohol use: Not Currently       No Known Allergies    Medications Prior to Admission   Medication   • Prenatal Vit-Fe Fumarate-FA (PRENATAL 1+1 PO)   • albuterol (ProAir HFA) 90 mcg/act inhaler   • polyethylene glycol (MIRALAX) 17 g packet           OBJECTIVE:  Vitals:  Temp:  [99.7 °F (37.6 °C)-101.6 °F (38.7 °C)] 99.7 °F (37.6 °C)  HR:  [110-121] 113  Resp:  [18-20] 20  BP: (110-117)/(67-74) 117/74  Body mass index is 26.78 kg/m². Physical Exam:  General: Well appearing, no distress  Respiratory: Unlabored breathing  Cardiovascular: Regular rate. Abdomen: Soft, gravid, nontender  Fundal Height: Appropriate for gestational age. Extremities: Warm and well perfused. Non tender.   Psychiatric: Behavioral normal        FHT:  Baseline:  Variability:  Accelerations:  Decelerations:    TOCO:    no contractions      Prenatal Labs:   Blood Type: No results found for: "ABO"  , D (Rh type): No results found for: "RH"  , Antibody Screen: No results found for: "ANTIBODYSCR" , HCT/HGB:   Lab Results   Component Value Date/Time    Hematocrit 34.5 (L) 09/05/2023 01:27 PM    Hemoglobin 11.3 (L) 09/05/2023 01:27 PM      , MCV:   Lab Results   Component Value Date/Time    MCV 87 09/05/2023 01:27 PM      , Platelets:   Lab Results   Component Value Date/Time    Platelets 054 (L) 30/72/5024 01:27 PM      , 1 hour Glucola:   Lab Results   Component Value Date/Time    Glucose 156 (H) 06/29/2023 09:49 AM   , Varicella: No results found for: "VARICELLAIGG"    , Rubella: No results found for: "RUBELLAIGGQT"     , VDRL/RPR: No results found for: "RPR"   , Hep B: No results found for: "HEPBSAG"  , Hep C: No components found for: "HEPCSAG", "EXTHEPCSAG"   , HIV: No results found for: "HIVAGAB"  , Chlamydia: No results found for: "EXTCHLAMYDIA"  , Gonorrhea:   Lab Results   Component Value Date/Time    N gonorrhoeae, DNA Probe Negative 08/14/2023 08:13 AM     , Group B Strep:  No results found for: "STREPGRPB"   pending      Christine Munguia DO  9/5/2023  7:40 PM        Portions of the record may have been created with voice recognition software. Occasional wrong word or "sound a like" substitutions may have occurred due to the inherent limitations of voice recognition software.   Read the chart carefully and recognize, using context, where substitutions have occurred

## 2023-09-05 NOTE — SEPSIS NOTE
Sepsis Note   Kendy Going 20 y.o. female MRN: [id number]  Unit/Bed#: ED-41 Encounter: [id number]       Initial Sepsis Screening     Row Name 09/05/23 1658                Is the patient's history suggestive of a new or worsening infection? Yes (Proceed)  -CL        Suspected source of infection suspect infection, source unknown  -CL        Indicate SIRS criteria Tachycardia > 90 bpm;Hyperthemia > 38.3C (100.9F) OR Hypothermia <36C (96.8F)  -CL        Are two or more of the above signs & symptoms of infection both present and new to the patient? Yes (Proceed)  -CL        Assess for evidence of organ dysfunction: Are any of the below criteria present within 6 hours of suspected infection and SIRS criteria that are NOT considered to be chronic conditions? --              User Key  (r) = Recorded By, (t) = Taken By, (c) = Cosigned By    [address] [place] [address] Name Provider Kassandra Olvera MD Physician                    There is no height or weight on file to calculate BMI.   Wt Readings from Last 1 Encounters:   08/24/23 70.8 kg (156 lb)        Ideal body weight: 54.7 kg (120 lb 9.5 oz)  Adjusted ideal body weight: 61.1 kg (134 lb 12.1 oz)

## 2023-09-05 NOTE — PROCEDURES
Fidelia Shaikh, a [id number] at 36w1d with an REGGIE of 10/2/2023, by Patient Reported, was seen at [address] [place] [address] for the following procedure(s): $Procedure Type: VIOLET]                 4 Quadrant VIOLET  VIOLET Q1 (cm): 1.3 cm  VIOLET Q2 (cm): 2.7 cm  VIOLET Q3 (cm): 4.5 cm  VIOLET Q4 (cm): 0 cm  VIOLET TOTAL (cm): 8.5 cm

## 2023-09-05 NOTE — ED ATTENDING ATTESTATION
9/5/2023  IDana MD, saw and evaluated the patient. I have discussed the patient with the resident/non-physician practitioner and agree with the resident's/non-physician practitioner's findings, Plan of Care, and MDM as documented in the resident's/non-physician practitioner's note, except where noted. All available labs and Radiology studies were reviewed. I was present for key portions of any procedure(s) performed by the resident/non-physician practitioner and I was immediately available to provide assistance. At this point I agree with the current assessment done in the Emergency Department. I have conducted an independent evaluation of this patient a history and physical is as follows:    History    Patient is a 20 G2, P3 female currently at 39 weeks gestation who presents to the ED today with a 1 day history of myalgias. There is associated productive cough, fever, nonexertional nonpleuritic and nonradiating chest pain characterized as a pressure-like sensation, dyspnea, back pain, headache similar in character to prior headaches. Patient has taken Tylenol x2 pills to remit her symptoms. Position exacerbates her symptoms. Patient denies urinary retention, bowel or bladder incontinence, history of IVDU, trauma, vaginal bleeding, vaginal discharge. Patient states that her symptoms began gradually and been constant and progressively worsening since their onset. Patient is without other concerns at this time. Hospital translation services used during the encounter    ROS    Patient denies dysuria/polyuria/weakness/numbness/IVDU/difficulty walking/vaginal bleeding/vaginal discharge    Physical Exam    GENERAL APPEARANCE: NAD  NEURO: Cranial nerves 2-12 intact. 5/5 strength in all four extremities including finger extension against resistance. Sensation to light touch subjectively intact/equal in all four extremities and the face.   Patient able to ambulate without difficulty. Patient is speaking clearly in complete sentences. Patient is answering appropriately and able to follow commands. No saddle anesthesia. Intact L5 and S1 motor and sensory function bilaterally. 2/4 patellar reflexes bilaterally. Negative Kernig and Brezinski signs. Full neck range of motion/no rigidity. HEENT: PERRL, Moist mucous membranes, external ears normal, nose normal  Neck: No cervical adenopathy  CV: Tachycardic rate, regular rhythm. No murmurs, rubs, gallops  LUNGS: Clear to auscultation: No wheezes, stridor, rhonchi, rales  GI: Gravid abdomen. Bilateral CVA tenderness and suprapubic tenderness. No guarding  : Deferred at this time  MSK: No deformity. No midline C, T, L-spine tenderness to palpation. No calf swelling/no calf pain with squeeze. Skin: Warm and dry  Capillary refill: <2 seconds    Assessment/Plan  Fever/headache/abdominal pain/chest pain/dyspnea/productive cough  -Concern for pneumonia versus UTI versus viral syndrome versus ACS versus pericarditis /myocarditis versus pregnancy. No reason to suspect cauda equina/conus medullaris/meningitis/biliary pathology/appendicitis at this time based on history and physical exam  -Will investigate with sepsis panel order set, cardiac work-up  -Will manage with ceftriaxone, Tylenol, Reglan, fluids, further based upon work-up.     ED Course  ED Course as of 09/05/23 1711 Tue Sep 05, 2023   1511 Hemoglobin(!): 11.3  Low, chronic    1511 Anion Gap: 6  WNL   1512 Alkaline Phosphatase(!): 184  High    1643 LACTIC ACID: 1.3  WNL   1702 ECG per my independent interpretation: Tachycardic rate, regular rhythm, no ectopy, normal axis, no ST elevations or depressions     1708 Patient sent to L&D triage per OB/GYN         Critical Care Time  Procedures

## 2023-09-05 NOTE — ED PROCEDURE NOTE
Procedure  POC Pelvic US    Date/Time: 9/5/2023 5:25 PM    Performed by: Baylee Schuler DO  Authorized by: Baylee Schuler DO    Patient location:  ED  Other Assisting Provider: Yes (comment)    Procedure details:     Exam Type:  Educational    Indications: evaluate for IUP      Assessment for: confirm intrauterine pregnancy      Technique:  Transabdominal obstetric (HCG+) exam    Views obtained: uterus (transverse and sagittal)      Image quality: non-diagnostic      Image availability:  Video obtained  Uterine findings:     Intrauterine pregnancy: identified      Single gestation: identified      Fetal heart rate: identified      Fetal heart rate (bpm):  150  Interpretation:     Pregnancy findings: intrauterine pregnancy (IUP)    Comments:      Procedure performed with Stacia Adames and Alberto Champion DO  09/05/23 0420

## 2023-09-05 NOTE — ED PROVIDER NOTES
History  Chief Complaint   Patient presents with   • Flu Symptoms     Pt c/o body aches, chills, cough that started yesterday. 38 weeks pregnant. Denies abdominal pain and vaginal bleeding/discharge. • Back Pain     Left flank area     21 yo F  currently 38 wks who presents for evaluation of body aches, cough, back pain that began last night. Patient states she felt feverish yesterday evening and began to notice a productive cough with clear sputum. Reports feeling achy. Denies sick contacts. Denies SOB, chest pain, palpitations. Reports also noting bilateral low back pain that radiates into anterior aspect of bilateral lower extremities and stops at bilateral knees. Reports pain is worse with movement and laying down. Denies new lower extremity weakness. Denies dysuria, hematuria. Patient does report being diagnosed with chlamidya in second trimester for which she was treated and later tested negative. Denies vaginal bleeding, cramping pain, vaginal lesions, vaginal discharge. Patient states she has felt baby move today. States she has been following with OB regularly and denies other issues throughout pregnancy. Also reports pain in lower pelvic/lower abdominal region that she describes as constant and worse with movement. States she also notes generalized HA that began last night that she rates a 4/10 and a burning pain of bilateral eyes. Denies blurry vision, diplopia, speech difficulties, focal weakness, paresthesias, other complaints at this time. Patient is primarily [address] [place] [address] speaking, interviewed via translation services  #[id number]. Prior to Admission Medications   Prescriptions Last Dose Informant Patient Reported? Taking?    Prenatal Vit-Fe Fumarate-FA (PRENATAL 1+1 PO) 2023 Self Yes Yes   Sig: Take by mouth   albuterol (ProAir HFA) 90 mcg/act inhaler Unknown  No No   Sig: Inhale 2 puffs every 6 (six) hours as needed for wheezing or shortness of breath   polyethylene glycol (MIRALAX) 17 g packet  Self No No   Sig: Take 17 g by mouth daily   Patient not taking: Reported on 8/24/2023      Facility-Administered Medications: None       History reviewed. No pertinent past medical history. History reviewed. No pertinent surgical history. Family History   Problem Relation Age of Onset   • No Known Problems Mother    • No Known Problems Father    • No Known Problems Sister    • No Known Problems Brother    • No Known Problems Son    • No Known Problems Maternal Grandmother    • No Known Problems Maternal Grandfather    • No Known Problems Paternal Grandmother    • No Known Problems Paternal Grandfather      I have reviewed and agree with the history as documented. E-Cigarette/Vaping   • E-Cigarette Use Never User      E-Cigarette/Vaping Substances   • Nicotine No    • THC No    • CBD No    • Flavoring No    • Other No    • Unknown No      Social History     Tobacco Use   • Smoking status: Never     Passive exposure: Past   • Smokeless tobacco: Never   Vaping Use   • Vaping Use: Never used   Substance Use Topics   • Alcohol use: Not Currently   • Drug use: Never        Review of Systems   All other systems reviewed and are negative. Physical Exam  ED Triage Vitals [09/05/23 1312]   Temperature Pulse Respirations Blood Pressure SpO2   100.1 °F (37.8 °C) (!) 121 20 116/67 99 %      Temp Source Heart Rate Source Patient Position - Orthostatic VS BP Location FiO2 (%)   Oral Monitor Sitting Right arm --      Pain Score       9             Orthostatic Vital Signs  Vitals:    09/05/23 1312 09/05/23 1702 09/05/23 1703 09/05/23 1848   BP: 116/67  110/69 117/74   Pulse: (!) 121 (!) 111 (!) 110 (!) 113   Patient Position - Orthostatic VS: Sitting  Lying Lying       Physical Exam  Vitals reviewed. Constitutional:       General: She is not in acute distress. Appearance: She is not toxic-appearing or diaphoretic. HENT:      Head: Normocephalic.    Eyes:      Pupils: Pupils are equal, round, and reactive to light. Cardiovascular:      Rate and Rhythm: Normal rate and regular rhythm. Pulmonary:      Effort: Pulmonary effort is normal.      Breath sounds: Normal breath sounds. Abdominal:      General: Bowel sounds are normal.      Tenderness: There is no abdominal tenderness. There is right CVA tenderness and left CVA tenderness (gravid abdomen). There is no guarding or rebound. Musculoskeletal:         General: Normal range of motion. Cervical back: Neck supple. Skin:     General: Skin is warm. Coloration: Skin is not jaundiced. Neurological:      Mental Status: She is alert. Mental status is at baseline. Sensory: No sensory deficit. Motor: No weakness. Psychiatric:         Mood and Affect: Mood normal.         ED Medications  Medications   sodium chloride 0.9 % infusion (has no administration in time range)   sodium chloride 0.9 % bolus 1,000 mL (has no administration in time range)   sodium chloride 0.9 % bolus 1,000 mL (1,000 mL Intravenous New Bag 9/5/23 1910)   sodium chloride 0.9 % infusion (has no administration in time range)   acetaminophen (TYLENOL) tablet 325 mg (has no administration in time range)   sodium chloride 0.9 % bolus 1,000 mL (1,000 mL Intravenous New Bag 9/5/23 1610)   ceftriaxone (ROCEPHIN) 1 g/50 mL in dextrose IVPB (0 mg Intravenous Stopped 9/5/23 1640)   acetaminophen (TYLENOL) tablet 650 mg (650 mg Oral Given 9/5/23 1616)   metoclopramide (REGLAN) injection 10 mg (10 mg Intravenous Given 9/5/23 1616)       Diagnostic Studies  Results Reviewed     Procedure Component Value Units Date/Time    Blood culture [[id number]] Collected: 09/05/23 1607    Lab Status: Preliminary result Specimen: Blood from Arm, Left Updated: 09/05/23 1902     Blood Culture Received in Microbiology Lab. Culture in Progress.     Blood culture [[id number]] Collected: 09/05/23 1605    Lab Status: Preliminary result Specimen: Blood from Arm, Right Updated: 09/05/23 1902 Blood Culture Received in Microbiology Lab. Culture in Progress. Urine Microscopic [[id number]]  (Abnormal) Collected: 09/05/23 1644    Lab Status: Final result Specimen: Urine, Clean Catch Updated: 09/05/23 1732     RBC, UA 1-2 /hpf      WBC, UA 2-4 /hpf      Epithelial Cells Occasional /hpf      Bacteria, UA None Seen /hpf      MUCUS THREADS Occasional     URINE COMMENT --    UA w Reflex to Microscopic w Reflex to Culture [[id number]]  (Abnormal) Collected: 09/05/23 1644    Lab Status: Final result Specimen: Urine, Clean Catch Updated: 09/05/23 1730     Color, UA Yellow     Clarity, UA Clear     Specific Gravity, UA 1.023     pH, UA 6.5     Leukocytes, UA Trace     Nitrite, UA Negative     Protein, UA Trace mg/dl      Glucose, UA Negative mg/dl      Ketones, UA Negative mg/dl      Urobilinogen, UA <2.0 mg/dl      Bilirubin, UA Negative     Occult Blood, UA Negative     URINE COMMENT --    Urine culture [[id number]] Collected: 09/05/23 1644    Lab Status: In process Specimen: Urine, Clean Catch Updated: 09/05/23 1730    HS Troponin 0hr (reflex protocol) [[id number]]  (Normal) Collected: 09/05/23 1617    Lab Status: Final result Specimen: Blood from Arm, Right Updated: 09/05/23 1646     hs TnI 0hr <2 ng/L     Procalcitonin [[id number]]  (Normal) Collected: 09/05/23 1605    Lab Status: Final result Specimen: Blood from Arm, Right Updated: 09/05/23 1641     Procalcitonin 0.09 ng/ml     Lactic acid, plasma (w/reflex if result > 2.0) [[id number]]  (Normal) Collected: 09/05/23 1605    Lab Status: Final result Specimen: Blood from Arm, Right Updated: 09/05/23 1631     LACTIC ACID 1.3 mmol/L     Narrative:      Result may be elevated if tourniquet was used during collection.     Wally Miller [[id number]]  (Normal) Collected: 09/05/23 1605    Lab Status: Final result Specimen: Blood from Arm, Right Updated: 09/05/23 1627     Protime 13.8 seconds      INR 1.00    APTT [[id number]]  (Normal) Collected: 09/05/23 1605    Lab Status: Final result Specimen: Blood from Arm, Right Updated: 09/05/23 1627     PTT 29 seconds     RBC Morphology Reflex Test [[id number]] Collected: 09/05/23 1327    Lab Status: Final result Specimen: Blood from Arm, Right Updated: 09/05/23 1501    FLU/RSV/COVID - if FLU/RSV clinically relevant [[id number]]  (Normal) Collected: 09/05/23 1317    Lab Status: Final result Specimen: Nares from Nose Updated: 09/05/23 1421     SARS-CoV-2 Negative     INFLUENZA A PCR Negative     INFLUENZA B PCR Negative     RSV PCR Negative    Narrative:      FOR PEDIATRIC PATIENTS - copy/paste COVID Guidelines URL to browser: [email or web address]    SARS-CoV-2 assay is a Nucleic Acid Amplification assay intended for the  qualitative detection of nucleic acid from SARS-CoV-2 in nasopharyngeal  swabs. Results are for the presumptive identification of SARS-CoV-2 RNA. Positive results are indicative of infection with SARS-CoV-2, the virus  causing COVID-19, but do not rule out bacterial infection or co-infection  with other viruses. Laboratories within the [facility] and its  territories are required to report all positive results to the appropriate  public health authorities. Negative results do not preclude SARS-CoV-2  infection and should not be used as the sole basis for treatment or other  patient management decisions. Negative results must be combined with  clinical observations, patient history, and epidemiological information. This test has not been FDA cleared or approved. This test has been authorized by FDA under an Emergency Use Authorization  (EUA). This test is only authorized for the duration of time the  declaration that circumstances exist justifying the authorization of the  emergency use of an in vitro diagnostic tests for detection of SARS-CoV-2  virus and/or diagnosis of COVID-19 infection under section 564(b)(1) of  the Act, 21 U. S.C. 663FXJ-8(X)(8), unless the authorization is terminated  or revoked sooner. The test has been validated but independent review by FDA  and CLIA is pending. Test performed using [employer] GeneXpert: This RT-PCR assay targets N2,  a region unique to SARS-CoV-2. A conserved region in the E-gene was chosen  for pan-Sarbecovirus detection which includes SARS-CoV-2. According to CMS-2020-01-R, this platform meets the definition of high-throughput technology. CBC and differential [[id number]]  (Abnormal) Collected: 09/05/23 1327    Lab Status: Final result Specimen: Blood from Arm, Right Updated: 09/05/23 1405     WBC 10.68 Thousand/uL      RBC 3.98 Million/uL      Hemoglobin 11.3 g/dL      Hematocrit 34.5 %      MCV 87 fL      MCH 28.4 pg      MCHC 32.8 g/dL      RDW 14.7 %      MPV 12.6 fL      Platelets 233 Thousands/uL     Narrative: This is an appended report. These results have been appended to a previously verified report.     Manual Differential(PHLEBS Do Not Order) [[id number]]  (Abnormal) Collected: 09/05/23 1327    Lab Status: Final result Specimen: Blood from Arm, Right Updated: 09/05/23 1405     Segmented % 92 %      Lymphocytes % 3 %      Monocytes % 5 %      Eosinophils, % 0 %      Basophils % 0 %      Absolute Neutrophils 9.83 Thousand/uL      Lymphocytes Absolute 0.32 Thousand/uL      Monocytes Absolute 0.53 Thousand/uL      Eosinophils Absolute 0.00 Thousand/uL      Basophils Absolute 0.00 Thousand/uL      Total Counted --     RBC Morphology Normal     Platelet Estimate Borderline    Comprehensive metabolic panel [[id number]]  (Abnormal) Collected: 09/05/23 1327    Lab Status: Final result Specimen: Blood from Arm, Right Updated: 09/05/23 1401     Sodium 135 mmol/L      Potassium 3.6 mmol/L      Chloride 109 mmol/L      CO2 20 mmol/L      ANION GAP 6 mmol/L      BUN 5 mg/dL      Creatinine 0.49 mg/dL      Glucose 107 mg/dL      Calcium 8.5 mg/dL      AST 17 U/L      ALT 9 U/L      Alkaline Phosphatase 184 U/L      Total Protein 6.6 g/dL      Albumin 3.6 g/dL      Total Bilirubin 0.37 mg/dL      eGFR 137 ml/min/1.73sq m     Narrative:      [facility] guidelines for Chronic Kidney Disease (CKD):   •  Stage 1 with normal or high GFR (GFR > 90 mL/min/1.73 square meters)  •  Stage 2 Mild CKD (GFR = 60-89 mL/min/1.73 square meters)  •  Stage 3A Moderate CKD (GFR = 45-59 mL/min/1.73 square meters)  •  Stage 3B Moderate CKD (GFR = 30-44 mL/min/1.73 square meters)  •  Stage 4 Severe CKD (GFR = 15-29 mL/min/1.73 square meters)  •  Stage 5 End Stage CKD (GFR <15 mL/min/1.73 square meters)  Note: GFR calculation is accurate only with a steady state creatinine                 XR chest 2 views   ED Interpretation by Johanne Hernandez MD (09/05 1701)   Per my independent interpretation: Concern for right middle lung consolidation            Procedures  Procedures      ED Course  ED Course as of 09/05/23 1956 Tue Sep 05, 2023   1650 Patient meets SIRS criteria, prophylactic Ceftriaxone started. 56 Spoke with Dr. Denney Masters with Dr. Renetta Moya from [facility]. Patient to be sent to L&D triage. HEART Risk Score    Flowsheet Row Most Recent Value   Heart Score Risk Calculator    History 0 Filed at: 09/05/2023 1704   ECG 0 Filed at: 09/05/2023 1704   Age 0 Filed at: 09/05/2023 1704   Risk Factors 1 Filed at: 09/05/2023 1704   Troponin 0 Filed at: 09/05/2023 1704   HEART Score 1 Filed at: 09/05/2023 1704                   Initial Sepsis Screening     Row Name 09/05/23 1658                Is the patient's history suggestive of a new or worsening infection? Yes (Proceed)  -CL        Suspected source of infection suspect infection, source unknown  -CL        Indicate SIRS criteria Tachycardia > 90 bpm;Hyperthemia > 38.3C (100.9F) OR Hypothermia <36C (96.8F)  -CL        Are two or more of the above signs & symptoms of infection both present and new to the patient?  Yes (Proceed)  -CL        Assess for evidence of organ dysfunction: Are any of the below criteria present within 6 hours of suspected infection and SIRS criteria that are NOT considered to be chronic conditions? --              User Key  (r) = Recorded By, (t) = Taken By, (c) = Cosigned By    [facility] Name Provider Type    Shahram Kauffman MD Physician                SBIRT 22yo+    Flowsheet Row Most Recent Value   Initial Alcohol Screen: US AUDIT-C     1. How often do you have a drink containing alcohol? 0 Filed at: 2023   2. How many drinks containing alcohol do you have on a typical day you are drinking? 0 Filed at: 2023   3a. Male UNDER 65: How often do you have five or more drinks on one occasion? 0 Filed at: 2023 153   3b. FEMALE Any Age, or MALE 65+: How often do you have 4 or more drinks on one occassion? 0 Filed at: 2023   Audit-C Score 0 Filed at: 2023   ALINA: How many times in the past year have you. .. Used an illegal drug or used a prescription medication for non-medical reasons? Never Filed at: 2023                Medical Decision Making  19 yo F  currently 38 wks who presents for evaluation of body aches, cough, back pain, lower abd/pelvic pain that began last night. Patient febrile tmax 100.6, , CBC with WBC of 10.68, lactate wnl, procalcitonin wnl. Fetal HR 150s. US demonstrates. EKG with sinus tachycardia, no acute abnormalities. CXR demonstrates possible RML consolidation. NS fluid bolus, Tylenol,  Rocephin given in ED. Patient appears hemodynamically stable. Sent to L&D triage per discussion with OBGYN. Amount and/or Complexity of Data Reviewed  Labs: ordered. Radiology: ordered and independent interpretation performed. Risk  OTC drugs. Prescription drug management.             Disposition  Final diagnoses:   Sepsis ([address])   Acute chest pain     Time reflects when diagnosis was documented in both MDM as applicable and the Disposition within this note Time User Action Codes Description Comment    9/5/2023  5:01 PM Galilea GARCIA Add [A41.9] Sepsis ([address])     9/5/2023  5:05 PM Valery Liao A Add [R07.9] Acute chest pain       ED Disposition     ED Disposition   Send to L&D After Provider Eval    Condition   --    Date/Time   Tue Sep 5, 2023  5:11 PM    Comment   --         Follow-up Information    None         Current Discharge Medication List      CONTINUE these medications which have NOT CHANGED    Details   Prenatal Vit-Fe Fumarate-FA (PRENATAL 1+1 PO) Take by mouth      albuterol (ProAir HFA) 90 mcg/act inhaler Inhale 2 puffs every 6 (six) hours as needed for wheezing or shortness of breath  Qty: 8.5 g, Refills: 0    Comments: Substitution to a formulary equivalent within the same pharmaceutical class is authorized. Associated Diagnoses: Asthma during pregnancy      polyethylene glycol (MIRALAX) 17 g packet Take 17 g by mouth daily  Qty: 10 each, Refills: 0    Associated Diagnoses: Constipation during pregnancy           No discharge procedures on file. PDMP Review     None           ED Provider  Attending physically available and evaluated Fredrick Younger. I managed the patient along with the ED Attending.     Electronically Signed by         Jes Cabrera DO  09/05/23 1956

## 2023-09-06 VITALS
RESPIRATION RATE: 20 BRPM | OXYGEN SATURATION: 99 % | SYSTOLIC BLOOD PRESSURE: 108 MMHG | DIASTOLIC BLOOD PRESSURE: 60 MMHG | TEMPERATURE: 98.6 F | BODY MASS INDEX: 26.63 KG/M2 | HEART RATE: 77 BPM | HEIGHT: 64 IN | WEIGHT: 156 LBS

## 2023-09-06 PROBLEM — Z3A.36 36 WEEKS GESTATION OF PREGNANCY: Status: ACTIVE | Noted: 2023-07-27

## 2023-09-06 LAB
ABO GROUP BLD: NORMAL
ALBUMIN SERPL BCP-MCNC: 2.8 G/DL (ref 3.5–5)
ALP SERPL-CCNC: 148 U/L (ref 34–104)
ALT SERPL W P-5'-P-CCNC: 11 U/L (ref 7–52)
ANION GAP SERPL CALCULATED.3IONS-SCNC: 6 MMOL/L
AST SERPL W P-5'-P-CCNC: 16 U/L (ref 13–39)
ATRIAL RATE: 111 BPM
BACTERIA UR CULT: NORMAL
BASOPHILS # BLD AUTO: 0.03 THOUSANDS/ÂΜL (ref 0–0.1)
BASOPHILS NFR BLD AUTO: 0 % (ref 0–1)
BILIRUB SERPL-MCNC: 0.36 MG/DL (ref 0.2–1)
BUN SERPL-MCNC: 3 MG/DL (ref 5–25)
CALCIUM ALBUM COR SERPL-MCNC: 9.1 MG/DL (ref 8.3–10.1)
CALCIUM SERPL-MCNC: 8.1 MG/DL (ref 8.4–10.2)
CHLORIDE SERPL-SCNC: 113 MMOL/L (ref 96–108)
CO2 SERPL-SCNC: 19 MMOL/L (ref 21–32)
CREAT SERPL-MCNC: 0.42 MG/DL (ref 0.6–1.3)
EOSINOPHIL # BLD AUTO: 0.01 THOUSAND/ÂΜL (ref 0–0.61)
EOSINOPHIL NFR BLD AUTO: 0 % (ref 0–6)
ERYTHROCYTE [DISTWIDTH] IN BLOOD BY AUTOMATED COUNT: 15 % (ref 11.6–15.1)
GFR SERPL CREATININE-BSD FRML MDRD: 144 ML/MIN/1.73SQ M
GLUCOSE SERPL-MCNC: 86 MG/DL (ref 65–140)
HCT VFR BLD AUTO: 30 % (ref 34.8–46.1)
HGB BLD-MCNC: 9.8 G/DL (ref 11.5–15.4)
HOLD SPECIMEN: NORMAL
IMM GRANULOCYTES # BLD AUTO: 0.06 THOUSAND/UL (ref 0–0.2)
IMM GRANULOCYTES NFR BLD AUTO: 1 % (ref 0–2)
LYMPHOCYTES # BLD AUTO: 0.92 THOUSANDS/ÂΜL (ref 0.6–4.47)
LYMPHOCYTES NFR BLD AUTO: 13 % (ref 14–44)
MCH RBC QN AUTO: 28.5 PG (ref 26.8–34.3)
MCHC RBC AUTO-ENTMCNC: 32.7 G/DL (ref 31.4–37.4)
MCV RBC AUTO: 87 FL (ref 82–98)
MONOCYTES # BLD AUTO: 0.41 THOUSAND/ÂΜL (ref 0.17–1.22)
MONOCYTES NFR BLD AUTO: 6 % (ref 4–12)
NEUTROPHILS # BLD AUTO: 5.66 THOUSANDS/ÂΜL (ref 1.85–7.62)
NEUTS SEG NFR BLD AUTO: 80 % (ref 43–75)
NRBC BLD AUTO-RTO: 0 /100 WBCS
P AXIS: 46 DEGREES
PLATELET # BLD AUTO: 124 THOUSANDS/UL (ref 149–390)
PMV BLD AUTO: 12.9 FL (ref 8.9–12.7)
POTASSIUM SERPL-SCNC: 3.4 MMOL/L (ref 3.5–5.3)
PR INTERVAL: 138 MS
PROT SERPL-MCNC: 5.3 G/DL (ref 6.4–8.4)
QRS AXIS: 66 DEGREES
QRSD INTERVAL: 72 MS
QT INTERVAL: 328 MS
QTC INTERVAL: 446 MS
RBC # BLD AUTO: 3.44 MILLION/UL (ref 3.81–5.12)
RH BLD: POSITIVE
S PYO DNA THROAT QL NAA+PROBE: NOT DETECTED
SODIUM SERPL-SCNC: 138 MMOL/L (ref 135–147)
T WAVE AXIS: 34 DEGREES
TREPONEMA PALLIDUM IGG+IGM AB [PRESENCE] IN SERUM OR PLASMA BY IMMUNOASSAY: NORMAL
VENTRICULAR RATE: 111 BPM
WBC # BLD AUTO: 7.09 THOUSAND/UL (ref 4.31–10.16)

## 2023-09-06 PROCEDURE — 87081 CULTURE SCREEN ONLY: CPT

## 2023-09-06 PROCEDURE — 86780 TREPONEMA PALLIDUM: CPT

## 2023-09-06 PROCEDURE — 99238 HOSP IP/OBS DSCHRG MGMT 30/<: CPT | Performed by: OBSTETRICS & GYNECOLOGY

## 2023-09-06 PROCEDURE — 93010 ELECTROCARDIOGRAM REPORT: CPT | Performed by: INTERNAL MEDICINE

## 2023-09-06 PROCEDURE — 80053 COMPREHEN METABOLIC PANEL: CPT

## 2023-09-06 PROCEDURE — 85025 COMPLETE CBC W/AUTO DIFF WBC: CPT

## 2023-09-06 PROCEDURE — 87651 STREP A DNA AMP PROBE: CPT

## 2023-09-06 PROCEDURE — 87158 CULTURE TYPING ADDED METHOD: CPT

## 2023-09-06 PROCEDURE — NC001 PR NO CHARGE: Performed by: OBSTETRICS & GYNECOLOGY

## 2023-09-06 RX ORDER — ACETAMINOPHEN 325 MG/1
650 TABLET ORAL EVERY 4 HOURS PRN
Status: DISCONTINUED | OUTPATIENT
Start: 2023-09-06 | End: 2023-09-06 | Stop reason: HOSPADM

## 2023-09-06 RX ORDER — ACETAMINOPHEN 325 MG/1
650 TABLET ORAL EVERY 6 HOURS PRN
Qty: 60 TABLET | Refills: 0 | Status: SHIPPED | OUTPATIENT
Start: 2023-09-06

## 2023-09-06 RX ADMIN — SODIUM CHLORIDE 125 ML/HR: 0.9 INJECTION, SOLUTION INTRAVENOUS at 02:50

## 2023-09-06 RX ADMIN — ACETAMINOPHEN 650 MG: 325 TABLET, FILM COATED ORAL at 06:19

## 2023-09-06 NOTE — PLAN OF CARE
Problem: PAIN - ADULT  Goal: Verbalizes/displays adequate comfort level or baseline comfort level  Description: Interventions:  - Encourage patient to monitor pain and request assistance  - Assess pain using appropriate pain scale  - Administer analgesics based on type and severity of pain and evaluate response  - Implement non-pharmacological measures as appropriate and evaluate response  - Consider cultural and social influences on pain and pain management  - Notify physician/advanced practitioner if interventions unsuccessful or patient reports new pain  Outcome: Progressing     Problem: INFECTION - ADULT  Goal: Absence or prevention of progression during hospitalization  Description: INTERVENTIONS:  - Assess and monitor for signs and symptoms of infection  - Monitor lab/diagnostic results  - Monitor all insertion sites, i.e. indwelling lines, tubes, and drains  - Monitor endotracheal if appropriate and nasal secretions for changes in amount and color  - Anabel appropriate cooling/warming therapies per order  - Administer medications as ordered  - Instruct and encourage patient and family to use good hand hygiene technique  - Identify and instruct in appropriate isolation precautions for identified infection/condition  Outcome: Progressing  Goal: Absence of fever/infection during neutropenic period  Description: INTERVENTIONS:  - Monitor WBC    Outcome: Progressing     Problem: SAFETY ADULT  Goal: Patient will remain free of falls  Description: INTERVENTIONS:  - Educate patient/family on patient safety including physical limitations  - Instruct patient to call for assistance with activity   - Consult OT/PT to assist with strengthening/mobility   - Keep Call bell within reach  - Keep bed low and locked with side rails adjusted as appropriate  - Keep care items and personal belongings within reach  - Initiate and maintain comfort rounds    Outcome: Progressing  Goal: Maintain or return to baseline ADL function  Description: INTERVENTIONS:  -  Assess patient's ability to carry out ADLs; assess patient's baseline for ADL function and identify physical deficits which impact ability to perform ADLs (bathing, care of mouth/teeth, toileting, grooming, dressing, etc.)  - Assess/evaluate cause of self-care deficits   - Assess range of motion  - Assess patient's mobility; develop plan if impaired  - Assess patient's need for assistive devices and provide as appropriate  - Encourage maximum independence but intervene and supervise when necessary  - Involve family in performance of ADLs  - Assess for home care needs following discharge   - Consider OT consult to assist with ADL evaluation and planning for discharge  - Provide patient education as appropriate  Outcome: Progressing  Goal: Maintains/Returns to pre admission functional level  Description: INTERVENTIONS:  - Perform BMAT or MOVE assessment daily.   - Set and communicate daily mobility goal to care team and patient/family/caregiver.    - Collaborate with rehabilitation services on mobility goals if consulted    - Out of bed for toileting  - Record patient progress and toleration of activity level   Outcome: Progressing     Problem: DISCHARGE PLANNING  Goal: Discharge to home or other facility with appropriate resources  Description: INTERVENTIONS:  - Identify barriers to discharge w/patient and caregiver  - Arrange for needed discharge resources and transportation as appropriate  - Identify discharge learning needs (meds, wound care, etc.)  - Arrange for interpretive services to assist at discharge as needed  - Refer to Case Management Department for coordinating discharge planning if the patient needs post-hospital services based on physician/advanced practitioner order or complex needs related to functional status, cognitive ability, or social support system  Outcome: Progressing     Problem: Knowledge Deficit  Goal: Patient/family/caregiver demonstrates understanding of disease process, treatment plan, medications, and discharge instructions  Description: Complete learning assessment and assess knowledge base.   Interventions:  - Provide teaching at level of understanding  - Provide teaching via preferred learning methods  Outcome: Progressing     Problem: ANTEPARTUM  Goal: Maintain pregnancy as long as maternal and/or fetal condition is stable  Description: INTERVENTIONS:  - Maternal surveillance  - Fetal surveillance  - Monitor uterine activity  - Medications as ordered  - Bedrest  Outcome: Progressing

## 2023-09-06 NOTE — PLAN OF CARE
Problem: PAIN - ADULT  Goal: Verbalizes/displays adequate comfort level or baseline comfort level  Description: Interventions:  - Encourage patient to monitor pain and request assistance  - Assess pain using appropriate pain scale  - Administer analgesics based on type and severity of pain and evaluate response  - Implement non-pharmacological measures as appropriate and evaluate response  - Consider cultural and social influences on pain and pain management  - Notify physician/advanced practitioner if interventions unsuccessful or patient reports new pain  Outcome: Progressing     Problem: INFECTION - ADULT  Goal: Absence or prevention of progression during hospitalization  Description: INTERVENTIONS:  - Assess and monitor for signs and symptoms of infection  - Monitor lab/diagnostic results  - Monitor all insertion sites, i.e. indwelling lines, tubes, and drains  - Monitor endotracheal if appropriate and nasal secretions for changes in amount and color  - Tye appropriate cooling/warming therapies per order  - Administer medications as ordered  - Instruct and encourage patient and family to use good hand hygiene technique  - Identify and instruct in appropriate isolation precautions for identified infection/condition  Outcome: Progressing  Goal: Absence of fever/infection during neutropenic period  Description: INTERVENTIONS:  - Monitor WBC    Outcome: Progressing     Problem: SAFETY ADULT  Goal: Patient will remain free of falls  Description: INTERVENTIONS:  - Educate patient/family on patient safety including physical limitations  - Instruct patient to call for assistance with activity   - Consult OT/PT to assist with strengthening/mobility   - Keep Call bell within reach  - Keep bed low and locked with side rails adjusted as appropriate  - Keep care items and personal belongings within reach  - Initiate and maintain comfort rounds  - Make Fall Risk Sign visible to staff  - Offer Toileting every  Hours, in advance of need  - Initiate/Maintain alarm  - Obtain necessary fall risk management equipment:   - Apply yellow socks and bracelet for high fall risk patients  - Consider moving patient to room near nurses station  Outcome: Progressing  Goal: Maintain or return to baseline ADL function  Description: INTERVENTIONS:  -  Assess patient's ability to carry out ADLs; assess patient's baseline for ADL function and identify physical deficits which impact ability to perform ADLs (bathing, care of mouth/teeth, toileting, grooming, dressing, etc.)  - Assess/evaluate cause of self-care deficits   - Assess range of motion  - Assess patient's mobility; develop plan if impaired  - Assess patient's need for assistive devices and provide as appropriate  - Encourage maximum independence but intervene and supervise when necessary  - Involve family in performance of ADLs  - Assess for home care needs following discharge   - Consider OT consult to assist with ADL evaluation and planning for discharge  - Provide patient education as appropriate  Outcome: Progressing  Goal: Maintains/Returns to pre admission functional level  Description: INTERVENTIONS:  - Perform BMAT or MOVE assessment daily.   - Set and communicate daily mobility goal to care team and patient/family/caregiver. - Collaborate with rehabilitation services on mobility goals if consulted  - Perform Range of Motion  times a day. - Reposition patient every  hours.   - Dangle patient  times a day  - Stand patient  times a day  - Ambulate patient  times a day  - Out of bed to chair  times a day   - Out of bed for meals times a day  - Out of bed for toileting  - Record patient progress and toleration of activity level   Outcome: Progressing     Problem: DISCHARGE PLANNING  Goal: Discharge to home or other facility with appropriate resources  Description: INTERVENTIONS:  - Identify barriers to discharge w/patient and caregiver  - Arrange for needed discharge resources and transportation as appropriate  - Identify discharge learning needs (meds, wound care, etc.)  - Arrange for interpretive services to assist at discharge as needed  - Refer to Case Management Department for coordinating discharge planning if the patient needs post-hospital services based on physician/advanced practitioner order or complex needs related to functional status, cognitive ability, or social support system  Outcome: Progressing     Problem: Knowledge Deficit  Goal: Patient/family/caregiver demonstrates understanding of disease process, treatment plan, medications, and discharge instructions  Description: Complete learning assessment and assess knowledge base.   Interventions:  - Provide teaching at level of understanding  - Provide teaching via preferred learning methods  Outcome: Progressing     Problem: ANTEPARTUM  Goal: Maintain pregnancy as long as maternal and/or fetal condition is stable  Description: INTERVENTIONS:  - Maternal surveillance  - Fetal surveillance  - Monitor uterine activity  - Medications as ordered  - Bedrest  Outcome: Progressing

## 2023-09-06 NOTE — PROGRESS NOTES
Progress Note - Antepartum  Unice Burn 20 y.o. female MRN: [id number]  Unit/Bed#: -01 Encounter: [id number]  Admit date: 9/5/2023. 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 By issue:    36 weeks gestation of pregnancy  Assessment & Plan  SVE: 1/50/-3 on admit  Denies contractions    * Fever  Assessment & Plan  WBC 10.7 on admit  TMax 101.6, resolved  Tachycardia improved with IV fluid hydration  Fetal tachycardia noted in the 180s. Improved with fluids. Continous monitoring.   Neg covid/flu/RSV, UA wnl  Strep A culture to be collected  Blood, Urine cultures pending  VIOLET 9cm             Subjective    Contractions: no  Loss of fluid: no  Vaginal bleeding: no  Fetal movement: yes    Pain: no  Headaches: yes, reports diffuse mild headache  Sore throat: yes  Nasal congestion: yes  Visual changes: no  Chest pain: no  Shortness of breath: no  Nausea: no  Vomiting/Diarrhea: no  Dysuria: no  Leg pain: no          Objective      Patient Vitals for the past 24 hrs:   BP Temp Temp src Pulse Resp SpO2 Height Weight   09/06/23 0426 110/62 97.5 °F (36.4 °C) Oral 93 18 98 % -- --   09/06/23 0154 -- -- -- 105 -- -- -- --   09/06/23 0018 111/63 98.1 °F (36.7 °C) Oral 100 16 97 % -- --   09/06/23 0017 -- -- -- (!) 106 -- -- -- --   09/05/23 2346 -- -- -- 105 -- 99 % -- --   09/05/23 2342 -- -- -- (!) 111 -- 99 % -- --   09/05/23 2337 -- -- -- (!) 110 -- 98 % -- --   09/05/23 2332 -- -- -- (!) 114 -- 99 % -- --   09/05/23 2327 -- -- -- (!) 121 -- 99 % -- --   09/05/23 2322 -- -- -- (!) 106 -- 98 % -- --   09/05/23 2317 -- -- -- (!) 111 -- 97 % -- --   09/05/23 2303 -- -- -- (!) 130 -- 97 % -- --   09/05/23 2258 -- -- -- (!) 122 -- 97 % -- --   09/05/23 2257 -- -- -- (!) 122 -- 97 % -- --   09/05/23 2253 -- -- -- (!) 122 -- 97 % -- --   09/05/23 2248 -- -- -- (!) 139 -- 97 % -- --   09/05/23 2243 -- -- -- (!) 134 -- 97 % -- --   09/05/23 2238 -- 100 °F (37.8 °C) Oral (!) 127 -- 98 % -- --   09/05/23 2132 123/75 100 °F (37.8 °C) Oral (!) 126 18 99 % -- --   09/05/23 1848 117/74 99.7 °F (37.6 °C) Oral (!) 113 20 -- 5' 4" (1.626 m) 70.8 kg (156 lb)   09/05/23 1703 110/69 -- -- (!) 110 18 98 % -- --   09/05/23 1702 -- -- -- (!) 111 -- -- -- --   09/05/23 1545 -- (!) 101.6 °F (38.7 °C) Rectal -- -- -- -- --   09/05/23 1536 -- 100.1 °F (37.8 °C) Oral -- -- -- -- --   09/05/23 1312 116/67 100.1 °F (37.8 °C) Oral (!) 121 20 99 % -- --       Physical Exam  Vitals reviewed. Constitutional:       General: She is not in acute distress. HENT:      Head: Normocephalic and atraumatic. Right Ear: External ear normal.      Left Ear: External ear normal.      Nose: Nose normal.      Mouth/Throat:      Pharynx: Oropharynx is clear. No oropharyngeal exudate or posterior oropharyngeal erythema. Eyes:      Extraocular Movements: Extraocular movements intact. Pupils: Pupils are equal, round, and reactive to light. Cardiovascular:      Rate and Rhythm: Normal rate. Pulses: Normal pulses. Pulmonary:      Effort: Pulmonary effort is normal. No respiratory distress. Breath sounds: Normal breath sounds. Abdominal:      Palpations: Abdomen is soft. Tenderness: There is no abdominal tenderness. Musculoskeletal:         General: Normal range of motion. Cervical back: Normal range of motion. Skin:     General: Skin is warm and dry. Neurological:      General: No focal deficit present. Mental Status: She is alert. Motor: No weakness. Psychiatric:         Mood and Affect: Mood normal.         Judgment: Judgment normal.         I/O       09/04 0701 09/05 0700 09/05 0701 09/06 0700    P. O.  100    I.V. (mL/kg)  656.3 (9.3)    IV Piggyback  50    Total Intake(mL/kg)  806.3 (11.4)    Urine (mL/kg/hr)  1350    Total Output  1350    Net  -543.8                Invasive Devices     Peripheral Intravenous Line  Duration Peripheral IV 23 Right Antecubital <1 day                Results from last 7 days   Lab Units 23  1327   WBC Thousand/uL 10.68*   HEMOGLOBIN g/dL 11.3*   MCV fL 87   PLATELETS Thousands/uL 141*     Results from last 7 days   Lab Units 23  1327   POTASSIUM mmol/L 3.6   CHLORIDE mmol/L 109*   CO2 mmol/L 20*   BUN mg/dL 5   CREATININE mg/dL 0.49*   EGFR ml/min/1.73sq m 137     Results from last 7 days   Lab Units 23  1327   AST U/L 17   ALT U/L 9     Results from last 7 days   Lab Units 23  1605 23  1327   PLATELETS Thousands/uL  --  141*   INR  1.00  --    PROTIME seconds 13.8  --    PTT seconds 29  --          Results from last 7 days   Lab Units 23  1909   PROT/CREAT RATIO UR  0.12*                   Brief review of pertinent history:  History reviewed. No pertinent past medical history. History reviewed. No pertinent surgical history.   OB History    Para Term  AB Living   2 1 1   0 1   SAB IAB Ectopic Multiple Live Births   0 0 0 0 1      # Outcome Date GA Lbr Torrey/2nd Weight Sex Delivery Anes PTL Lv   2 Current            1 Term 2018    M Vag-Spont   PAGE       Fetal data:  Nonstress test: date 23 Time: 0500 - 0520  Baseline: 145  Variability: moderate  Accelerations: present, 15x15  Decelerations: absent  Contractions: absent  Assessment: reactive  Plan: continue TID and PRN      MEDS:   Medication Administration - last 24 hours from 2023 0526 to 2023 0526       Date/Time Order Dose Route Action Action by     2023 1910 EDT sodium chloride 0.9 % bolus 1,000 mL 0 mL Intravenous Stopped Roxana Del Cid RN     2023 1610 EDT sodium chloride 0.9 % bolus 1,000 mL 1,000 mL Intravenous [address] Raquel Campos RN     2023 1640 EDT ceftriaxone (ROCEPHIN) 1 g/50 mL in dextrose IVPB 0 mg Intravenous Stopped Raquel Campos RN     2023 1610 EDT ceftriaxone (ROCEPHIN) 1 g/50 mL in dextrose IVPB 1,000 mg Intravenous New Bag Carolann Espino RN     09/05/2023 1616 EDT acetaminophen (TYLENOL) tablet 650 mg 650 mg Oral Given Carolann Espino RN     09/05/2023 1616 EDT metoclopramide (REGLAN) injection 10 mg 10 mg Intravenous Given Carolann Espino RN     09/06/2023 0250 EDT sodium chloride 0.9 % infusion 125 mL/hr Intravenous [facility] Nadia Turpin     09/06/2023 3352 EDT sodium chloride 0.9 % infusion 0 mL/hr Intravenous Stopped Christine Singletary RN     09/06/2023 0016 EDT sodium chloride 0.9 % infusion 125 mL/hr Intravenous Restarted Christine Singletary RN     09/05/2023 1900 EDT sodium chloride 0.9 % infusion 125 mL/hr Intravenous Not Given Darrick Davenport RN     09/06/2023 0015 EDT sodium chloride 0.9 % bolus 1,000 mL 0 mL Intravenous Stopped Christine Singletary RN     09/05/2023 2305 EDT sodium chloride 0.9 % bolus 1,000 mL 1,000 mL Intravenous New Bag Christine Humphrey RN     09/05/2023 1900 EDT sodium chloride 0.9 % bolus 1,000 mL 1,000 mL Intravenous Not Given Darrick Davenport RN     09/05/2023 2010 EDT sodium chloride 0.9 % bolus 1,000 mL 0 mL Intravenous Stopped Darrick Davenport RN     09/05/2023 1910 EDT sodium chloride 0.9 % bolus 1,000 mL 1,000 mL Intravenous New FirstPamela Simmons RN     09/05/2023 2306 EDT sodium chloride 0.9 % infusion 0 mL/hr Intravenous Stopped Christine Singletary RN     09/05/2023 2024 EDT sodium chloride 0.9 % infusion 125 mL/hr Intravenous New FirstEnermarlys Simmons RN     09/05/2023 2137 EDT sodium chloride 0.9 % infusion -- Intravenous Canceled Entry Christine Humphrey RN     09/05/2023 2142 EDT acetaminophen (TYLENOL) tablet 650 mg 650 mg Oral Given Christine Singletary RN        Current Facility-Administered Medications   Medication Dose Route Frequency   • acetaminophen (TYLENOL) tablet 650 mg  650 mg Oral Q4H PRN   • calcium carbonate (TUMS) chewable tablet 1,000 mg  1,000 mg Oral Daily PRN   • metoclopramide (REGLAN) injection 10 mg  10 mg Intravenous Q6H PRN   • ondansetron (ZOFRAN) injection 4 mg  4 mg Intravenous Q8H PRN   • simethicone (MYLICON) chewable tablet 80 mg  80 mg Oral 4x Daily PRN   • sodium chloride 0.9 % infusion  125 mL/hr Intravenous Continuous            DO ROGELIO Peñaloza, PGY-2  9/6/2023  5:26 AM

## 2023-09-06 NOTE — UTILIZATION REVIEW
Initial Clinical Review    Admission: Date/Time/Statement:   Admission Orders (From admission, onward)     Ordered        09/05/23 2028  Place in Observation  Once                      Orders Placed This Encounter   Procedures   • Place in Observation     Standing Status:   Standing     Number of Occurrences:   1     Order Specific Question:   Level of Care     Answer:   Med Surg [16]     ED Arrival Information     Expected   -    Arrival   9/5/2023 12:57    Acuity   Urgent            Means of arrival   Walk-In    Escorted by   Spouse    Service   OB/GYN    Admission type   Emergency            Arrival complaint   Fever/Body aches/Cough 38 wks preg            Chief Complaint   Patient presents with   • Flu Symptoms     Pt c/o body aches, chills, cough that started yesterday. 38 weeks pregnant. Denies abdominal pain and vaginal bleeding/discharge. • Back Pain     Left flank area       Initial Presentation: 20 y.o. female presented to L&D as observation for left flank pain G 2 P 1 @ 36 1/7 weeks  C/o of body aches, cough, back pain that began last night. Patient states she felt feverish yesterday evening and began to notice a productive cough with clear sputum. Reports feeling achy. Denies sick contacts. Denies SOB, chest pain, palpitations. Reports also noting bilateral low back pain that radiates into anterior aspect of bilateral lower extremities and stops at bilateral knees. Reports pain is worse with movement and laying down. Denies new lower extremity weakness. Denies dysuria, hematuria. Patient does report being diagnosed with chlamidya in second trimester for which she was treated and later tested negative. Denies cramping leaking or bleeding  (+) fetal movement. On exam right CVA tenderness and left CVA tenderness.   Plan IV Rocephin x 1  IVF external monitoring and supportive care     ED Triage Vitals [09/05/23 1312]   Temperature Pulse Respirations Blood Pressure SpO2   100.1 °F (37.8 °C) (!) 121 20 116/67 99 %      Temp Source Heart Rate Source Patient Position - Orthostatic VS BP Location FiO2 (%)   Oral Monitor Sitting Right arm --      Pain Score       9          Wt Readings from Last 1 Encounters:   09/05/23 70.8 kg (156 lb)     Additional Vital Signs:     Date/Time Temp Pulse Resp BP SpO2 O2 Device Cardiac (WDL) Patient Position - Orthostatic VS   09/06/23 0832 98.3 °F (36.8 °C) 88 20 102/60 98 % -- -- --   09/06/23 0426 97.5 °F (36.4 °C) 93 18 110/62 98 % -- -- Lying   09/06/23 0154 -- 105 -- -- -- -- -- --   09/06/23 0018 98.1 °F (36.7 °C) 100 16 111/63 97 % -- -- Lying   09/06/23 0017 -- 106 Abnormal  -- -- -- -- -- --   09/05/23 2357 -- -- -- -- -- -- WDL --   09/05/23 2346 -- 105 -- -- 99 % -- -- --   09/05/23 2342 -- 111 Abnormal  -- -- 99 % -- -- --   09/05/23 2337 -- 110 Abnormal  -- -- 98 % -- -- --   09/05/23 2332 -- 114 Abnormal  -- -- 99 % -- -- --   09/05/23 2327 -- 121 Abnormal  -- -- 99 % -- -- --   09/05/23 2322 -- 106 Abnormal  -- -- 98 % -- -- --   09/05/23 2317 -- 111 Abnormal  -- -- 97 % -- -- --   09/05/23 2303 -- 130 Abnormal  -- -- 97 % -- -- --   09/05/23 2258 -- 122 Abnormal  -- -- 97 % -- -- --   09/05/23 2257 -- 122 Abnormal  -- -- 97 % -- -- --   09/05/23 2253 -- 122 Abnormal  -- -- 97 % -- -- --   09/05/23 2248 -- 139 Abnormal  -- -- 97 % -- -- --   09/05/23 2243 -- 134 Abnormal  -- -- 97 % -- -- --   09/05/23 2238 100 °F (37.8 °C) 127 Abnormal  -- -- 98 % -- -- --   09/05/23 2132 100 °F (37.8 °C) 126 Abnormal  18 123/75 99 % -- -- Lying   09/05/23 1848 99.7 °F (37.6 °C) 113 Abnormal  20 117/74 -- -- -- Lying   09/05/23 1703 -- 110 Abnormal  18 110/69 98 % None (Room air) -- Lying   09/05/23 1702 -- 111 Abnormal  -- -- -- -- -- --   09/05/23 1545 101.6 °F (38.7 °C) Abnormal  -- -- -- -- -- -- --   09/05/23 1536 100.1 °F (37.8 °C) -- -- -- -- -- -- --         Pertinent Labs/Diagnostic Test Results:   XR chest 2 views    (09/05 1701)   Per my independent interpretation: Concern for right middle lung consolidation       (09/05 2131)      Right perihilar and infrahilar opacities suspected suggesting possible pneumonia.      Results from last 7 days   Lab Units 09/05/23  1317   SARS-COV-2  Negative     Results from last 7 days   Lab Units 09/06/23  0511 09/05/23  1327   WBC Thousand/uL 7.09 10.68*   HEMOGLOBIN g/dL 9.8* 11.3*   HEMATOCRIT % 30.0* 34.5*   PLATELETS Thousands/uL 124* 141*   NEUTROS ABS Thousands/µL 5.66  --          Results from last 7 days   Lab Units 09/06/23  0511 09/05/23  1327   SODIUM mmol/L 138 135   POTASSIUM mmol/L 3.4* 3.6   CHLORIDE mmol/L 113* 109*   CO2 mmol/L 19* 20*   ANION GAP mmol/L 6 6   BUN mg/dL 3* 5   CREATININE mg/dL 0.42* 0.49*   EGFR ml/min/1.73sq m 144 137   CALCIUM mg/dL 8.1* 8.5     Results from last 7 days   Lab Units 09/06/23  0511 09/05/23  1327   AST U/L 16 17   ALT U/L 11 9   ALK PHOS U/L 148* 184*   TOTAL PROTEIN g/dL 5.3* 6.6   ALBUMIN g/dL 2.8* 3.6   TOTAL BILIRUBIN mg/dL 0.36 0.37         Results from last 7 days   Lab Units 09/06/23  0511 09/05/23  1327   GLUCOSE RANDOM mg/dL 86 107       Results from last 7 days   Lab Units 09/05/23  1617   HS TNI 0HR ng/L <2         Results from last 7 days   Lab Units 09/05/23  1605   PROTIME seconds 13.8   INR  1.00   PTT seconds 29         Results from last 7 days   Lab Units 09/05/23  1605   PROCALCITONIN ng/ml 0.09     Results from last 7 days   Lab Units 09/05/23  1605   LACTIC ACID mmol/L 1.3       Results from last 7 days   Lab Units 09/05/23  1909 09/05/23  1644   CLARITY UA   --  Clear   COLOR UA   --  Yellow   SPEC GRAV UA   --  1.023   PH UA   --  6.5   GLUCOSE UA mg/dl  --  Negative   KETONES UA mg/dl  --  Negative   BLOOD UA   --  Negative   PROTEIN UA mg/dl  --  Trace*   NITRITE UA   --  Negative   BILIRUBIN UA   --  Negative   UROBILINOGEN UA (BE) mg/dl  --  <2.0   LEUKOCYTES UA   --  Trace*   WBC UA /hpf  --  2-4*   RBC UA /hpf  --  1-2   BACTERIA UA /hpf  --  None Seen   EPITHELIAL CELLS WET PREP /hpf  --  Occasional   MUCUS THREADS   --  Occasional*   CREATININE UR mg/dL 41.5  --    PROTEIN UR mg/dL 5  --    PROT/CREAT RATIO UR  0.12*  --      Results from last 7 days   Lab Units 09/05/23  1317   INFLUENZA A PCR  Negative   INFLUENZA B PCR  Negative   RSV PCR  Negative     Results from last 7 days   Lab Units 09/05/23  1607 09/05/23  1605   BLOOD CULTURE  Received in Microbiology Lab. Culture in Progress. Received in Microbiology Lab. Culture in Progress. OB Triage  Treatment:   Medication Administration from 09/05/2023 1257 to 09/05/2023 1830       Date/Time Order Dose Route Action     09/05/2023 1610 EDT sodium chloride 0.9 % bolus 1,000 mL 1,000 mL Intravenous New Bag     09/05/2023 1610 EDT ceftriaxone (ROCEPHIN) 1 g/50 mL in dextrose IVPB 1,000 mg Intravenous New Bag     09/05/2023 1616 EDT acetaminophen (TYLENOL) tablet 650 mg 650 mg Oral Given     09/05/2023 1616 EDT metoclopramide (REGLAN) injection 10 mg 10 mg Intravenous Given        History reviewed. No pertinent past medical history. Present on Admission:  **None**      Admitting Diagnosis: Acute chest pain [R07.9]  Pregnant [Z34.90]  Flu-like symptoms [R68.89]  Sepsis ([address]) [A41.9]  Encounter for supervision of normal pregnancy, unspecified, unspecified trimester [Z34.90]  Age/Sex: 20 y.o. female     Admission Orders:  Continuous external monitoring       Scheduled Medications:     Continuous IV Infusions:  sodium chloride, 125 mL/hr, Intravenous, Continuous      PRN Meds:  acetaminophen, 650 mg, Oral, Q4H PRN  calcium carbonate, 1,000 mg, Oral, Daily PRN  metoclopramide, 10 mg, Intravenous, Q6H PRN  ondansetron, 4 mg, Intravenous, Q8H PRN  simethicone, 80 mg, Oral, 4x Daily PRN        None    Network Utilization Review Department  ATTENTION: Please call with any questions or concerns to [phone] and carefully listen to the prompts so that you are directed to the right person.  All voicemails are confidential.  Lanny rg requests for admission clinical reviews, approved or denied determinations and any other requests to dedicated fax number below belonging to the campus where the patient is receiving treatment.  List of dedicated fax numbers for the Facilities:  [facility] (Administrative/Medical Necessity) [phone] [address] (Maternity/NICU/Pediatrics) [phone]   [address] [place] [address] [phone]   [facility] [address] [facility] [phone]   [address] [place] [address] [phone]   [address] [facility] [address] [phone]

## 2023-09-06 NOTE — PLAN OF CARE
Problem: PAIN - ADULT  Goal: Verbalizes/displays adequate comfort level or baseline comfort level  Description: Interventions:  - Encourage patient to monitor pain and request assistance  - Assess pain using appropriate pain scale  - Administer analgesics based on type and severity of pain and evaluate response  - Implement non-pharmacological measures as appropriate and evaluate response  - Consider cultural and social influences on pain and pain management  - Notify physician/advanced practitioner if interventions unsuccessful or patient reports new pain  9/6/2023 1507 by Uriel Esposito RN  Outcome: Adequate for Discharge  9/6/2023 1147 by Uriel Esposito RN  Outcome: Progressing     Problem: INFECTION - ADULT  Goal: Absence or prevention of progression during hospitalization  Description: INTERVENTIONS:  - Assess and monitor for signs and symptoms of infection  - Monitor lab/diagnostic results  - Monitor all insertion sites, i.e. indwelling lines, tubes, and drains  - Monitor endotracheal if appropriate and nasal secretions for changes in amount and color  - [place] appropriate cooling/warming therapies per order  - Administer medications as ordered  - Instruct and encourage patient and family to use good hand hygiene technique  - Identify and instruct in appropriate isolation precautions for identified infection/condition  9/6/2023 1507 by Uriel Esposito RN  Outcome: Adequate for Discharge  9/6/2023 1147 by Uriel Esposito RN  Outcome: Progressing  Goal: Absence of fever/infection during neutropenic period  Description: INTERVENTIONS:  - Monitor WBC    9/6/2023 1507 by Uriel Esposito RN  Outcome: Adequate for Discharge  9/6/2023 1147 by Uriel Esposito RN  Outcome: Progressing     Problem: SAFETY ADULT  Goal: Patient will remain free of falls  Description: INTERVENTIONS:  - Educate patient/family on patient safety including physical limitations  - Instruct patient to call for assistance with activity   - Consult OT/PT to assist with strengthening/mobility   - Keep Call bell within reach  - Keep bed low and locked with side rails adjusted as appropriate  - Keep care items and personal belongings within reach  - Initiate and maintain comfort rounds  - Make Fall Risk Sign visible to staff    9/6/2023 1507 by Claudean Floor, RN  Outcome: Adequate for Discharge  9/6/2023 1147 by Claudean Floor, RN  Outcome: Progressing  Goal: Maintain or return to baseline ADL function  Description: INTERVENTIONS:  -  Assess patient's ability to carry out ADLs; assess patient's baseline for ADL function and identify physical deficits which impact ability to perform ADLs (bathing, care of mouth/teeth, toileting, grooming, dressing, etc.)  - Assess/evaluate cause of self-care deficits   - Assess range of motion  - Assess patient's mobility; develop plan if impaired  - Assess patient's need for assistive devices and provide as appropriate  - Encourage maximum independence but intervene and supervise when necessary  - Involve family in performance of ADLs  - Assess for home care needs following discharge   - Consider OT consult to assist with ADL evaluation and planning for discharge  - Provide patient education as appropriate  9/6/2023 1507 by Claudean Floor, RN  Outcome: Adequate for Discharge  9/6/2023 1147 by Claudean Floor, RN  Outcome: Progressing  Goal: Maintains/Returns to pre admission functional level  Description: INTERVENTIONS:  - Perform BMAT or MOVE assessment daily.   - Set and communicate daily mobility goal to care team and patient/family/caregiver.    - Collaborate with rehabilitation services on mobility goals if consulted    - Out of bed for toileting  - Record patient progress and toleration of activity level   9/6/2023 1507 by Claudean Floor, RN  Outcome: Adequate for Discharge  9/6/2023 1147 by Claudean Floor, RN  Outcome: Progressing     Problem: DISCHARGE PLANNING  Goal: Discharge to home or other facility with appropriate resources  Description: INTERVENTIONS:  - Identify barriers to discharge w/patient and caregiver  - Arrange for needed discharge resources and transportation as appropriate  - Identify discharge learning needs (meds, wound care, etc.)  - Arrange for interpretive services to assist at discharge as needed  - Refer to Case Management Department for coordinating discharge planning if the patient needs post-hospital services based on physician/advanced practitioner order or complex needs related to functional status, cognitive ability, or social support system  9/6/2023 1507 by Keli Nava RN  Outcome: Adequate for Discharge  9/6/2023 1147 by Keli Nava RN  Outcome: Progressing     Problem: Knowledge Deficit  Goal: Patient/family/caregiver demonstrates understanding of disease process, treatment plan, medications, and discharge instructions  Description: Complete learning assessment and assess knowledge base.   Interventions:  - Provide teaching at level of understanding  - Provide teaching via preferred learning methods  9/6/2023 1507 by Keli Nava RN  Outcome: Adequate for Discharge  9/6/2023 1147 by Keli Nava RN  Outcome: Progressing     Problem: ANTEPARTUM  Goal: Maintain pregnancy as long as maternal and/or fetal condition is stable  Description: INTERVENTIONS:  - Maternal surveillance  - Fetal surveillance  - Monitor uterine activity  - Medications as ordered  - Bedrest  9/6/2023 1507 by Keli Nava RN  Outcome: Adequate for Discharge  9/6/2023 1147 by Keli Nava RN  Outcome: Progressing

## 2023-09-06 NOTE — PROGRESS NOTES
I interviewed, took the history and examined the patient. I discussed the case with the Resident and reviewed the Resident’s note , prescribed medications, and orders placed. I supervised the Resident and I agree with the Resident management plan as it was presented to me. I was present on the floor and examined Hakeem Burn. She is suitable for d/c to home today, stable, viral illness, D/C Hgb 9.8,  RTO 1  week for prenatal care, throat culture pending.

## 2023-09-07 ENCOUNTER — ROUTINE PRENATAL (OUTPATIENT)
Dept: OBGYN CLINIC | Facility: CLINIC | Age: 23
End: 2023-09-07

## 2023-09-07 VITALS
HEIGHT: 64 IN | SYSTOLIC BLOOD PRESSURE: 111 MMHG | DIASTOLIC BLOOD PRESSURE: 75 MMHG | WEIGHT: 161.8 LBS | RESPIRATION RATE: 18 BRPM | HEART RATE: 60 BPM | BODY MASS INDEX: 27.62 KG/M2

## 2023-09-07 DIAGNOSIS — Z34.93 PRENATAL CARE, THIRD TRIMESTER: ICD-10-CM

## 2023-09-07 DIAGNOSIS — Z78.9 LANGUAGE BARRIER: Primary | ICD-10-CM

## 2023-09-07 DIAGNOSIS — Z3A.36 36 WEEKS GESTATION OF PREGNANCY: ICD-10-CM

## 2023-09-07 LAB — GP B STREP DNA SPEC QL NAA+PROBE: NEGATIVE

## 2023-09-07 PROCEDURE — 87150 DNA/RNA AMPLIFIED PROBE: CPT | Performed by: NURSE PRACTITIONER

## 2023-09-07 NOTE — PROGRESS NOTES
[facility]  OB/GYN prenatal visit    S: 20 y.o. Beto Tao 36w3d here for PN visit.  [id number] used. she has no obstetric complaints, including pelvic pain, contractions, vaginal bleeding, loss of fluid, or decreased fetal movement. Pt was in triage yesterday due to fever and body aches due to viral illness dehyration. No fever noted at home she reports symptoms have resolved, she feels well and drinking fluids well. 23 Hgb 9.8, 23 hgb 11.3, may be dilutional.  Negative chlamydia test 2023. Negative GC culture 2023  Strep A culture negative  O:  Vitals:    23 1032   BP: 111/75   Pulse: 60   Resp: 18       Gen: no acute distress, nonlabored breathing  Fundal Height (cm): 36 cm  Fetal Heart Rate: 142    A/P:      IUP at 36w3d  No obstetric complaints today  GBS done today  1 hour , passed her 3-hour GTT  Discussed  labor precautions and fetal kick counts    Return to clinic in 1 weeks       Problem List        Respiratory    Asthma during pregnancy       Other    36 weeks gestation of pregnancy    Overview     -Prenatal labs wnl from prior   -Gonorrhea/chlamydia screening neg  -Level II US wnl  - Elevated 1h gtt with normal 3h gtt completed 23  -Delivery plan is   -Delivery consent signed  -Contraception plan is Nexplanon  -Vaccinations: s/p tdap  -Return to office in 1 weeks  Was seen in triage 2023 for fever and body aches due to viral illness and dehydration.             Prenatal care, third trimester    Overview     Transfer of care from [facility]           Elevated glucose tolerance test    Overview     1h GTT elevated  3h GTT normal 23         Chlamydia infection affecting pregnancy in second trimester    Overview     S/p treatment and negative HEMANTH         Language barrier    Fever       JOE Harris  2023  11:01 AM

## 2023-09-08 PROBLEM — R50.9 FEVER: Status: RESOLVED | Noted: 2023-09-05 | Resolved: 2023-09-08

## 2023-09-09 LAB — N GONORRHOEA SPEC QL CULT: NORMAL

## 2023-09-10 LAB
BACTERIA BLD CULT: NORMAL
BACTERIA BLD CULT: NORMAL

## 2023-09-11 LAB — GP B STREP DNA SPEC QL NAA+PROBE: NEGATIVE

## 2023-09-13 ENCOUNTER — ROUTINE PRENATAL (OUTPATIENT)
Dept: OBGYN CLINIC | Facility: CLINIC | Age: 23
End: 2023-09-13

## 2023-09-13 VITALS
DIASTOLIC BLOOD PRESSURE: 81 MMHG | HEART RATE: 61 BPM | RESPIRATION RATE: 18 BRPM | BODY MASS INDEX: 27.83 KG/M2 | HEIGHT: 64 IN | WEIGHT: 163 LBS | SYSTOLIC BLOOD PRESSURE: 122 MMHG

## 2023-09-13 DIAGNOSIS — Z3A.36 36 WEEKS GESTATION OF PREGNANCY: ICD-10-CM

## 2023-09-13 DIAGNOSIS — Z34.93 PRENATAL CARE, THIRD TRIMESTER: Primary | ICD-10-CM

## 2023-09-13 NOTE — PROGRESS NOTES
Fredrick Younger presents today for routine OB visit at 37w2d. Blood Pressure: 122/81  Wt=73.9 kg (163 lb); Body mass index is 27.98 kg/m².; TWG=Not found. Fetal Heart Rate: 148; Fundal Height (cm): 37 cm  Abdomen: gravid, soft, non-tender. .  Denies  uterine contractions. Denies vaginal bleeding or leaking of fluid. Reports adequate fetal movement of at least 10 movements in 2 hours once daily. Scheduled for ultrasound . Reviewed premature labor precautions and fetal kick counts. Advised to continue medications and return in 1 weeks.       Current Outpatient Medications   Medication Instructions   • acetaminophen (TYLENOL) 650 mg, Oral, Every 6 hours PRN   • albuterol (ProAir HFA) 90 mcg/act inhaler 2 puffs, Inhalation, Every 6 hours PRN   • Prenatal Vit-Fe Fumarate-FA (PRENATAL 1+1 PO) Oral         Pregnancy Problems (from 06/15/23 to present)     Problem Noted Resolved    36 weeks gestation of pregnancy 2023 by Rupesh Zayas MD No    Overview Addendum 2023  2:54 PM by Eddie Eid DO     -Prenatal labs wnl from prior   -Gonorrhea/chlamydia screening neg   -Level II US wnl  - Elevated 1h gtt with normal 3h gtt completed 23  -Delivery plan is   -Delivery consent signed  -Contraception plan is Nexplanon  -Vaccinations: s/p tdap  -Return to office in 1 weeks  - GBS negative 23  -Blood type A positive            Prenatal care, third trimester 2023 by Rupesh Zayas MD No    Overview Addendum 2023  8:56 AM by Rupesh Zayas MD     Transfer of care from [facility]

## 2023-09-19 PROBLEM — Z3A.38 38 WEEKS GESTATION OF PREGNANCY: Status: ACTIVE | Noted: 2023-07-27

## 2023-09-20 ENCOUNTER — ROUTINE PRENATAL (OUTPATIENT)
Dept: OBGYN CLINIC | Facility: CLINIC | Age: 23
End: 2023-09-20

## 2023-09-20 VITALS
RESPIRATION RATE: 18 BRPM | DIASTOLIC BLOOD PRESSURE: 84 MMHG | SYSTOLIC BLOOD PRESSURE: 128 MMHG | HEIGHT: 64 IN | WEIGHT: 162 LBS | HEART RATE: 70 BPM | BODY MASS INDEX: 27.66 KG/M2

## 2023-09-20 DIAGNOSIS — Z78.9 LANGUAGE BARRIER: ICD-10-CM

## 2023-09-20 DIAGNOSIS — Z34.93 PRENATAL CARE, THIRD TRIMESTER: Primary | ICD-10-CM

## 2023-09-20 DIAGNOSIS — Z3A.38 38 WEEKS GESTATION OF PREGNANCY: ICD-10-CM

## 2023-09-20 PROCEDURE — 99213 OFFICE O/P EST LOW 20 MIN: CPT | Performed by: NURSE PRACTITIONER

## 2023-09-20 NOTE — PROGRESS NOTES
[address]  OB/GYN prenatal visit    S: 20 y.o. Jeannene Bloch 38w1d here for PN visit.  [id number] used. She has no obstetric complaints, including pelvic pain, contractions, vaginal bleeding, loss of fluid, or decreased fetal movement. Feels a little pelvic pressure, few contractions per day. Denies any HA's, visual changes or epigastric pain.      O:  Vitals:    23 0922   BP: 128/84   Pulse: 70   Resp: 18       Gen: no acute distress, nonlabored breathing  Fundal Height (cm): 38 cm  Fetal Heart Rate: 134    A/P:      IUP at 38w1d  Contraception: Nexplanon  Breastfeeding and bottle feeding  Birth plan:  desires spontaneous labor, IOL if not delivered by her REGGIE  Discussed  labor precautions and fetal kick counts    Return to clinic in 1 weeks    Problem List        Respiratory    Asthma during pregnancy       Other    38 weeks gestation of pregnancy    Overview     -Prenatal labs wnl from prior   -Gonorrhea/chlamydia screening neg   -Level II US wnl  - Elevated 1h gtt with normal 3h gtt completed 23  -Delivery plan is , desires spontaneous labor   -Delivery consent signed  -Contraception plan is Nexplanon  -Vaccinations: s/p tdap  -Return to office in 1 weeks  - GBS negative 23  -Blood type A positive   Breast and bottle           Prenatal care, third trimester    Overview     Transfer of care from [facility]           Elevated glucose tolerance test    Overview     1h GTT elevated  3h GTT normal 23         Chlamydia infection affecting pregnancy in second trimester    Overview     S/p treatment and negative HEMANTH  Culture done 23 both negative         Language barrier         JOE Quezada  2023  9:35 AM

## 2023-09-23 ENCOUNTER — HOSPITAL ENCOUNTER (INPATIENT)
Facility: HOSPITAL | Age: 23
LOS: 1 days | Discharge: HOME | End: 2023-09-24
Attending: OBSTETRICS & GYNECOLOGY | Admitting: STUDENT IN AN ORGANIZED HEALTH CARE EDUCATION/TRAINING PROGRAM
Payer: COMMERCIAL

## 2023-09-23 DIAGNOSIS — O47.9 UTERINE CONTRACTIONS: ICD-10-CM

## 2023-09-23 DIAGNOSIS — Z3A.38 38 WEEKS GESTATION OF PREGNANCY: ICD-10-CM

## 2023-09-23 LAB
ABO GROUP BLD: NORMAL
ALBUMIN SERPL BCP-MCNC: 3.5 G/DL (ref 3.5–5)
ALP SERPL-CCNC: 200 U/L (ref 34–104)
ALT SERPL W P-5'-P-CCNC: 7 U/L (ref 7–52)
ANION GAP SERPL CALCULATED.3IONS-SCNC: 9 MMOL/L
AST SERPL W P-5'-P-CCNC: 12 U/L (ref 13–39)
BASE EXCESS BLDCOA CALC-SCNC: -2.9 MMOL/L (ref 3–11)
BASE EXCESS BLDCOV CALC-SCNC: -4.3 MMOL/L (ref 1–9)
BILIRUB SERPL-MCNC: 0.33 MG/DL (ref 0.2–1)
BLD GP AB SCN SERPL QL: NEGATIVE
BUN SERPL-MCNC: 10 MG/DL (ref 5–25)
CALCIUM SERPL-MCNC: 9 MG/DL (ref 8.4–10.2)
CHLORIDE SERPL-SCNC: 106 MMOL/L (ref 96–108)
CO2 SERPL-SCNC: 20 MMOL/L (ref 21–32)
CREAT SERPL-MCNC: 0.53 MG/DL (ref 0.6–1.3)
CREAT UR-MCNC: 27.3 MG/DL
ERYTHROCYTE [DISTWIDTH] IN BLOOD BY AUTOMATED COUNT: 14.6 % (ref 11.6–15.1)
GFR SERPL CREATININE-BSD FRML MDRD: 134 ML/MIN/1.73SQ M
GLUCOSE SERPL-MCNC: 78 MG/DL (ref 65–140)
HCO3 BLDCOA-SCNC: 23.7 MMOL/L (ref 17.3–27.3)
HCO3 BLDCOV-SCNC: 19.8 MMOL/L (ref 12.2–28.6)
HCT VFR BLD AUTO: 34.6 % (ref 34.8–46.1)
HGB BLD-MCNC: 11.3 G/DL (ref 11.5–15.4)
MCH RBC QN AUTO: 27.8 PG (ref 26.8–34.3)
MCHC RBC AUTO-ENTMCNC: 32.7 G/DL (ref 31.4–37.4)
MCV RBC AUTO: 85 FL (ref 82–98)
OXYHGB MFR BLDCOA: 57.5 %
OXYHGB MFR BLDCOV: 74.3 %
PCO2 BLDCOA: 47.7 MM[HG] (ref 30–60)
PCO2 BLDCOV: 34 MM HG (ref 27–43)
PH BLDCOA: 7.32 [PH] (ref 7.23–7.43)
PH BLDCOV: 7.38 [PH] (ref 7.19–7.49)
PLATELET # BLD AUTO: 203 THOUSANDS/UL (ref 149–390)
PMV BLD AUTO: 13.1 FL (ref 8.9–12.7)
PO2 BLDCOA: <10 MM HG (ref 5–25)
PO2 BLDCOV: 30.5 MM HG (ref 15–45)
POTASSIUM SERPL-SCNC: 3.6 MMOL/L (ref 3.5–5.3)
PROT SERPL-MCNC: 6.5 G/DL (ref 6.4–8.4)
PROT UR-MCNC: <4 MG/DL
RBC # BLD AUTO: 4.06 MILLION/UL (ref 3.81–5.12)
RH BLD: POSITIVE
SAO2 % BLDCOV: 18.1 ML/DL
SODIUM SERPL-SCNC: 135 MMOL/L (ref 135–147)
SPECIMEN EXPIRATION DATE: NORMAL
TREPONEMA PALLIDUM IGG+IGM AB [PRESENCE] IN SERUM OR PLASMA BY IMMUNOASSAY: NORMAL
WBC # BLD AUTO: 10.93 THOUSAND/UL (ref 4.31–10.16)

## 2023-09-23 PROCEDURE — 80053 COMPREHEN METABOLIC PANEL: CPT

## 2023-09-23 PROCEDURE — 4A1HXCZ MONITORING OF PRODUCTS OF CONCEPTION, CARDIAC RATE, EXTERNAL APPROACH: ICD-10-PCS | Performed by: OBSTETRICS & GYNECOLOGY

## 2023-09-23 PROCEDURE — 59409 OBSTETRICAL CARE: CPT | Performed by: OBSTETRICS & GYNECOLOGY

## 2023-09-23 PROCEDURE — 82805 BLOOD GASES W/O2 SATURATION: CPT | Performed by: OBSTETRICS & GYNECOLOGY

## 2023-09-23 PROCEDURE — 82570 ASSAY OF URINE CREATININE: CPT

## 2023-09-23 PROCEDURE — 10907ZC DRAINAGE OF AMNIOTIC FLUID, THERAPEUTIC FROM PRODUCTS OF CONCEPTION, VIA NATURAL OR ARTIFICIAL OPENING: ICD-10-PCS | Performed by: OBSTETRICS & GYNECOLOGY

## 2023-09-23 PROCEDURE — 86900 BLOOD TYPING SEROLOGIC ABO: CPT

## 2023-09-23 PROCEDURE — 85027 COMPLETE CBC AUTOMATED: CPT

## 2023-09-23 PROCEDURE — 86901 BLOOD TYPING SEROLOGIC RH(D): CPT

## 2023-09-23 PROCEDURE — 84156 ASSAY OF PROTEIN URINE: CPT

## 2023-09-23 PROCEDURE — NC001 PR NO CHARGE: Performed by: STUDENT IN AN ORGANIZED HEALTH CARE EDUCATION/TRAINING PROGRAM

## 2023-09-23 PROCEDURE — 99215 OFFICE O/P EST HI 40 MIN: CPT

## 2023-09-23 PROCEDURE — 86780 TREPONEMA PALLIDUM: CPT

## 2023-09-23 PROCEDURE — 86850 RBC ANTIBODY SCREEN: CPT

## 2023-09-23 RX ORDER — OXYTOCIN/RINGER'S LACTATE 30/500 ML
250 PLASTIC BAG, INJECTION (ML) INTRAVENOUS ONCE
Status: COMPLETED | OUTPATIENT
Start: 2023-09-23 | End: 2023-09-23

## 2023-09-23 RX ORDER — IBUPROFEN 600 MG/1
600 TABLET ORAL EVERY 6 HOURS
Status: DISCONTINUED | OUTPATIENT
Start: 2023-09-23 | End: 2023-09-24 | Stop reason: HOSPADM

## 2023-09-23 RX ORDER — ACETAMINOPHEN 325 MG/1
975 TABLET ORAL EVERY 6 HOURS SCHEDULED
Status: DISCONTINUED | OUTPATIENT
Start: 2023-09-23 | End: 2023-09-24 | Stop reason: HOSPADM

## 2023-09-23 RX ORDER — METHYLERGONOVINE MALEATE 0.2 MG/ML
INJECTION INTRAVENOUS
Status: DISPENSED
Start: 2023-09-23 | End: 2023-09-23

## 2023-09-23 RX ORDER — CARBOPROST TROMETHAMINE 250 UG/ML
INJECTION, SOLUTION INTRAMUSCULAR
Status: DISPENSED
Start: 2023-09-23 | End: 2023-09-23

## 2023-09-23 RX ORDER — CALCIUM CARBONATE 500 MG/1
1000 TABLET, CHEWABLE ORAL DAILY PRN
Status: DISCONTINUED | OUTPATIENT
Start: 2023-09-23 | End: 2023-09-24 | Stop reason: HOSPADM

## 2023-09-23 RX ORDER — BUPIVACAINE HYDROCHLORIDE 2.5 MG/ML
30 INJECTION, SOLUTION EPIDURAL; INFILTRATION; INTRACAUDAL ONCE AS NEEDED
Status: DISCONTINUED | OUTPATIENT
Start: 2023-09-23 | End: 2023-09-23

## 2023-09-23 RX ORDER — DIAPER,BRIEF,INFANT-TODD,DISP
1 EACH MISCELLANEOUS DAILY PRN
Status: DISCONTINUED | OUTPATIENT
Start: 2023-09-23 | End: 2023-09-24 | Stop reason: HOSPADM

## 2023-09-23 RX ORDER — SODIUM CHLORIDE, SODIUM LACTATE, POTASSIUM CHLORIDE, CALCIUM CHLORIDE 600; 310; 30; 20 MG/100ML; MG/100ML; MG/100ML; MG/100ML
125 INJECTION, SOLUTION INTRAVENOUS CONTINUOUS
Status: DISCONTINUED | OUTPATIENT
Start: 2023-09-23 | End: 2023-09-23

## 2023-09-23 RX ORDER — ONDANSETRON 2 MG/ML
4 INJECTION INTRAMUSCULAR; INTRAVENOUS EVERY 8 HOURS PRN
Status: DISCONTINUED | OUTPATIENT
Start: 2023-09-23 | End: 2023-09-24 | Stop reason: HOSPADM

## 2023-09-23 RX ORDER — OXYTOCIN/RINGER'S LACTATE 30/500 ML
PLASTIC BAG, INJECTION (ML) INTRAVENOUS
Status: DISCONTINUED
Start: 2023-09-23 | End: 2023-09-23

## 2023-09-23 RX ORDER — DOCUSATE SODIUM 100 MG/1
100 CAPSULE, LIQUID FILLED ORAL 2 TIMES DAILY
Status: DISCONTINUED | OUTPATIENT
Start: 2023-09-23 | End: 2023-09-24 | Stop reason: HOSPADM

## 2023-09-23 RX ADMIN — SODIUM CHLORIDE, SODIUM LACTATE, POTASSIUM CHLORIDE, AND CALCIUM CHLORIDE 125 ML/HR: .6; .31; .03; .02 INJECTION, SOLUTION INTRAVENOUS at 08:17

## 2023-09-23 RX ADMIN — Medication 250 MILLI-UNITS/MIN: at 15:16

## 2023-09-23 RX ADMIN — BENZOCAINE AND LEVOMENTHOL 1 APPLICATION: 200; 5 SPRAY TOPICAL at 15:54

## 2023-09-23 RX ADMIN — SODIUM CHLORIDE, SODIUM LACTATE, POTASSIUM CHLORIDE, AND CALCIUM CHLORIDE 125 ML/HR: .6; .31; .03; .02 INJECTION, SOLUTION INTRAVENOUS at 03:26

## 2023-09-23 RX ADMIN — SODIUM CHLORIDE, SODIUM LACTATE, POTASSIUM CHLORIDE, AND CALCIUM CHLORIDE 125 ML/HR: .6; .31; .03; .02 INJECTION, SOLUTION INTRAVENOUS at 09:43

## 2023-09-23 RX ADMIN — SODIUM CHLORIDE, SODIUM LACTATE, POTASSIUM CHLORIDE, AND CALCIUM CHLORIDE 1000 ML/HR: .6; .31; .03; .02 INJECTION, SOLUTION INTRAVENOUS at 02:00

## 2023-09-23 RX ADMIN — IBUPROFEN 600 MG: 600 TABLET ORAL at 15:51

## 2023-09-23 RX ADMIN — WITCH HAZEL 1 PAD: 500 SOLUTION RECTAL; TOPICAL at 15:54

## 2023-09-23 NOTE — DISCHARGE SUMMARY
Obstetrics Discharge Summary  Talat Chester 20 y.o. female MRN: [id number]  Unit/Bed#: -01 Encounter: [id number]    Admission Date: 2023     Discharge Date: 23    Admitting Attending: Tal Stoner  Delivery Attending: Donal Narayanan  Discharging Attending: Ulysses Ku    Admitting Diagnoses:   Pregnancy 38w5d  Elevated 1hr GTT with normal 3hr GTT    Discharge Diagnoses:   Same as above, delivered    Procedures: spontaneous vaginal delivery    Anesthesia: none    Hospital course: Talat Chester is now a 20 y.o. [id number] who was initially admitted at 38w5d for spontaneous labor and fetal monitoring in the setting of a category II tracing. Initial cervical exam was 3/50/-3. Artificial amniotomy for clear fluid was performed. She progressed to complete cervical dilation and began pushing. On 23 she delivered a viable male  38w5d via normal spontaneous vaginal delivery. She sustained no lacerations during delivery. 's birth weight was 6lbs 8.2oz; Apgars were 8 (1 min) and 9 (5 min).  was admitted to the  nursery. Patient tolerated the procedure well. Her post-delivery course was uncomplicated. Her postpartum pain was well controlled with oral analgesics. Maternal blood type is A positive so RhoGAM was not indicated. On day of discharge, she was ambulating and able to reasonably perform all ADLs. She was voiding and had appropriate bowel function. Pain was well controlled. She was discharged home on postpartum day #1 without complications. Patient was instructed to follow up with her OBGYN as an outpatient and was given appropriate warnings to call provider if she develops signs of infection or uncontrolled pain. Complications: none apparent    Condition at discharge: stable     Provisions for Follow-Up Care:  Please see after visit summary for information related to follow-up care and any pertinent home health orders.       Disposition: Home    Planned Readmission: No    Discharge Medications:   Please see AVS for a complete list of discharge medications. Discharge instructions :   Please see AVS for complete discharge instructions.     Gabino Almodovar MD   PGY-I, OBGYN  9/26/2023  9:01 AM

## 2023-09-23 NOTE — L&D DELIVERY NOTE
OBGYN Vaginal Delivery Summary  Gabbie Norris 20 y.o. female MRN: [id number]  Unit/Bed#: -01 Encounter: [id number]    Predelivery Diagnosis:  1. Pregnancy at 38w5d   2. Asthma during pregnancy    Postdelivery Diagnosis:  1. Same as above  2. Delivery of term     Procedure: spontaneous vaginal delivery    Attending: Dr. Kimberlyn Echevarria    Assistant: Adan    Anesthesia: None    QBL: 25 mL  Admission Hgb: 11.3 g/dL  Admission platelets: 427 thousands/uL    Complications: none apparent    Specimens: cord blood, arterial and venous cord blood gases, placenta to storage    Findings:   1. Viable male at 1515, with APGARS of 8 and 9 at 1 and 5 minutes respectively. Weight pending at time of dictation. 2. Spontaneous delivery of intact placenta at 1519. Eccentric insertion of 3 vessel umbilical cord  3. No lacerations   4. Blood gases:  Umbilical Cord Venous Blood Gas:  Results from last 7 days   Lab Units 23  1519   PH COV  7.382   PCO2 COV mm HG 34.0   HCO3 COV mmol/L 19.8   BASE EXC COV mmol/L -4.3*   O2 CT CD VB mL/dL 18.1   O2 HGB, VENOUS CORD % 83.1     Umbilical Cord Arterial Blood Gas:  Results from last 7 days   Lab Units 23  1519   PH COA  7.315   PCO2 COA  47.7   PO2 COA mm HG <10.0   HCO3 COA mmol/L 23.7   BASE EXC COA mmol/L -2.9*   O2 HGB, ARTERIAL CORD % 57.5       Brief history and labor course:  Gabbie Norris is a 20 y.o.  at 38wk5d. She presented to labor and delivery for rule out rupture. Rupture workup was negative, but had consistent contractions on the monitor. FHT showed category 2 tracing with occasional late and variable decelerations. She was kept for extended monitoring and possible labor. Artificial amniotomy was performed for clear fluid. She progressed to complete cervical dilation and began pushing.      Description of procedure  After pushing for 18 minutes, the patient delivered a viable male  at 1 on 23, weight pending at time of dictation, apgars of 8 (1 min) and 9 (5 min). The fetal vertex delivered spontaneously. Baby restituted  to AZEEM. There was no nuchal cord. The anterior (right) shoulder delivered atraumatically with maternal expulsive forces and the assistance of gentle downward traction. The posterior shoulder delivered with maternal expulsive forces and the assistance of gentle upward traction. The remainder of the fetus delivered spontaneously. Upon delivery the infant was placed on the mother's abdomen and delayed cord clamping was performed. The umbilical cord was then doubly clamped and cut. The infant was noted to cry spontaneously and was moving all extremities appropriately. There was no evidence for injury. Awaiting nurse resuscitators evaluated the . Arterial and venous cord blood gases and cord blood were collected for analysis and were promptly sent to the lab. In the immediate post-partum, IV pitocin was administered, and the uterus was noted to contract down well with massage and pitocin. The placenta delivered spontaneously at [phone] and was noted to be intact and had an eccentrically inserted 3 vessel cord. The placenta was sent to storage. The vagina, cervix, perineum, and rectum were inspected. No laceration(s) were noted. At the conclusion of the procedure, all needle, sponge, and instrument counts were noted to be correct. Dr. Brittani Vasquez was present and participated in all key portions of the case. Disposition:  Patient tolerated the procedure well and was recovering in labor and delivery room.  to  nursery.       Shelli Richey MD  2023  3:29 PM

## 2023-09-23 NOTE — OB LABOR/OXYTOCIN SAFETY PROGRESS
Labor Progress Note - Mandi Bailey 20 y.o. female MRN: [id number]    Unit/Bed#: -01 Encounter: [id number]       Contraction Frequency (minutes): 3-6  Contraction Quality: Moderate  Tachysystole: No   Cervical Dilation: 6        Cervical Effacement: 70  Fetal Station: -1  Baseline Rate: 130 bpm  Fetal Heart Rate: 169 BPM  FHR Category: I               Vital Signs:   Vitals:    09/23/23 1300   BP: 124/80   Pulse: 61   Resp: 18   Temp: 98.2 °F (36.8 °C)   SpO2:        Notes/comments: Patient reassessed. Feeling contractions every 3-5 minutes. She is up walking around. Not interested in an epidural. Cervical exam as above. FHT category I. Will reassess in 2 hours or sooner if clinically indicated.          Wilfrido Swartz MD 9/23/2023 1:30 PM

## 2023-09-23 NOTE — ASSESSMENT & PLAN NOTE
Admit to OBGYN   Clear liquid diet   F/u T&S, CBC, RPR   IVF LR 125cc/hr   Continuous fetal monitoring and tocometry   Analgesia at maternal request   Vertex by TAUS  Continue to monitor

## 2023-09-23 NOTE — OB LABOR/OXYTOCIN SAFETY PROGRESS
Labor Progress Note - Keshav Garrett 20 y.o. female MRN: [id number]    Unit/Bed#: -01 Encounter: [id number]       Contraction Frequency (minutes): 7  Contraction Quality: Mild  Tachysystole: No   Cervical Dilation: 5        Cervical Effacement: 60  Fetal Station: -1  Baseline Rate: 125 bpm  Fetal Heart Rate: 141 BPM  FHR Category: I               Vital Signs:   Vitals:    09/23/23 0933   BP: 132/69   Pulse: 67   Resp:    Temp:    SpO2:        Notes/comments: Patient resting comfortably. Cervical exam as above. Artificial amniotomy for clear fluid performed. FHT category I. Will reassess in 2 hours or sooner if clinically indicated.          Jenni Puckett MD 9/23/2023 10:44 AM

## 2023-09-23 NOTE — H&P
[address] 20 y.o. female MRN: [id number]  Unit/Bed#:  205-01 Encounter: [id number]    Assessment: 20 y.o. Michael Hemphill at 38w5d admitted for labor. She presented for rule-out rupture and with contractions; rupture workup was negative, but she was tomas consistently on the monitor and had cervical change from her last exam. FHT significant for category 2 tracing due to occasional late and variable decelerations, so the decision was made to keep her for extended monitoring and possible labor. SVE: 3/50/-3  FHT: category 2  Clinical EFW: 26% ; Cephalic confirmed by ultrasound  GBS status: negative   Postpartum contraception plan: Nexplanon    Plan:   * Uterine contractions  Assessment & Plan  Admit to OBGYN   Clear liquid diet   F/u T&S, CBC, RPR   IVF LR 125cc/hr   Continuous fetal monitoring and tocometry   Analgesia at maternal request   Vertex by TAUS  Continue to monitor      Discussed case and plan w/ Dr. Maninder Bridges. Chief Complaint: contractions    HPI: Corinna Eng is a 20 y.o. Michael Hemphill with an REGGIE of 10/2/2023 at 38w5d here because she thinks she broke her water around 6pm today and has started tomas every few minutes. She has no other obstetric complaints, including no vaginal bleeding and she is feeling baby move. Patient Active Problem List   Diagnosis   • 38 weeks gestation of pregnancy   • Prenatal care, third trimester   • Elevated glucose tolerance test   • Chlamydia infection affecting pregnancy in second trimester   • Language barrier   • Asthma during pregnancy   • Uterine contractions       Baby complications/comments: none. Review of Systems   Constitutional: Negative. Respiratory: Negative. Cardiovascular: Negative. Gastrointestinal: Negative. Genitourinary: Negative.         OB Hx:  OB History    Para Term  AB Living   2 1 1   0 1   SAB IAB Ectopic Multiple Live Births   0 0 0 0 1      # Outcome Date GA Lbr [place]/2nd Weight Sex Delivery Anes PTL Lv   2 Current            1 Term 08/2018    Thomas Labor   PAGE       Past Medical Hx:  Past Medical History:   Diagnosis Date   • Asthma     as a child   • Varicella     hx as a child       Past Surgical hx:  History reviewed. No pertinent surgical history. Social Hx:  Alcohol use: no  Tobacco use: no  Other substance use: no    No Known Allergies    Medications Prior to Admission   Medication   • acetaminophen (TYLENOL) 325 mg tablet   • albuterol (ProAir HFA) 90 mcg/act inhaler   • Prenatal Vit-Fe Fumarate-FA (PRENATAL 1+1 PO)       Objective:  Temp:  [98 °F (36.7 °C)] 98 °F (36.7 °C)  HR:  [82] 82  Resp:  [18] 18  BP: (137)/(89) 137/89  There is no height or weight on file to calculate BMI.      Physical Exam:    SVE: 3/50/-3    FHT:  Baseline Rate: 120 bpm  Variability: Moderate 6-25 bpm  Accelerations: 15 x 15 or greater  Decelerations: (!) Variable, Late  FHR Category: Category II    TOCO:   Contraction Frequency (minutes): 2-8 (with irritability)  Contraction Duration (seconds): 50-70  Contraction Quality: Mild    Speculum exam:  Normal external female genitalia  Cervix fully visualized and not visibly dilated  Physiologic discharge  No bleeding, pooling, abnormal discharge, or lesions noted      Microscopy:    Membrane status   - negative ferning   - positive nitrazene   - negative pooling     Labs:   Recent Results (from the past 24 hour(s))   CBC and Platelet    Collection Time: 09/23/23  3:04 AM   Result Value Ref Range    WBC 10.93 (H) 4.31 - 10.16 Thousand/uL    RBC 4.06 3.81 - 5.12 Million/uL    Hemoglobin 11.3 (L) 11.5 - 15.4 g/dL    Hematocrit 34.6 (L) 34.8 - 46.1 %    MCV 85 82 - 98 fL    MCH 27.8 26.8 - 34.3 pg    MCHC 32.7 31.4 - 37.4 g/dL    RDW 14.6 11.6 - 15.1 %    Platelets 971 271 - 203 Thousands/uL    MPV 13.1 (H) 8.9 - 12.7 fL   Comprehensive metabolic panel    Collection Time: 09/23/23  3:04 AM   Result Value Ref Range    Sodium 135 135 - 147 mmol/L    Potassium 3.6 3.5 - 5.3 mmol/L    Chloride 106 96 - 108 mmol/L    CO2 20 (L) 21 - 32 mmol/L    ANION GAP 9 mmol/L    BUN 10 5 - 25 mg/dL    Creatinine 0.53 (L) 0.60 - 1.30 mg/dL    Glucose 78 65 - 140 mg/dL    Calcium 9.0 8.4 - 10.2 mg/dL    AST 12 (L) 13 - 39 U/L    ALT 7 7 - 52 U/L    Alkaline Phosphatase 200 (H) 34 - 104 U/L    Total Protein 6.5 6.4 - 8.4 g/dL    Albumin 3.5 3.5 - 5.0 g/dL    Total Bilirubin 0.33 0.20 - 1.00 mg/dL    eGFR 134 ml/min/1.73sq m   Protein / creatinine ratio, urine    Collection Time: 09/23/23  3:04 AM   Result Value Ref Range    Creatinine, Ur 27.3 Reference range not established. mg/dL    Protein Urine Random <4 Reference range not established. mg/dL    Prot/Creat Ratio, Ur     Type and screen    Collection Time: 09/23/23  3:04 AM   Result Value Ref Range    ABO Grouping A     Rh Factor Positive     Antibody Screen Negative     Specimen Expiration Date [id number]        Prenatal Labs: Reviewed      Blood type: A+  Antibody: negative  GBS: negative  HIV: negative  Rubella: immune  Syphilis IgM/IgG: negative  HBsAg: negative  HCAb: negative  Chlamydia: negative  Gonorrhea: negative  Diabetes 1 hour screen: 156  3 hour glucose: 07,646,390,595 - passed  Platelets: 479  Hgb: 11.3  >2 Midnights  INPATIENT     Signature/Title: Sasha Bay MD  Date: 9/23/2023  Time: 3:50 AM

## 2023-09-23 NOTE — OB LABOR/OXYTOCIN SAFETY PROGRESS
Labor Progress Note - Mandi Bailey 20 y.o. female MRN: [id number]    Unit/Bed#: -01 Encounter: [id number]       Contraction Frequency (minutes): 3-6  Contraction Quality: Moderate  Tachysystole: No   Cervical Dilation: 6        Cervical Effacement: 70  Fetal Station: -1  Baseline Rate: 130 bpm  Fetal Heart Rate: 169 BPM  FHR Category: II               Vital Signs:   Vitals:    09/23/23 1300   BP: 124/80   Pulse: 61   Resp: 18   Temp: 98.2 °F (36.8 °C)   SpO2:        Notes/comments: Patient reassessed in setting of variable deceleration with spontaneous return to baseline without intervention. Cervical exam unchanged. Will continue to monitor and reassess in 2 hours or sooner if clinically indicated.         Wilfrido Swartz MD 9/23/2023 1:35 PM

## 2023-09-23 NOTE — PROGRESS NOTES
Fetal heart baseline change after contraction noted then return to original baseline.  Questionable early, late, variable with slow return to baseline

## 2023-09-23 NOTE — PLAN OF CARE
Problem: ANTEPARTUM  Goal: Maintain pregnancy as long as maternal and/or fetal condition is stable  Description: INTERVENTIONS:  - Maternal surveillance  - Fetal surveillance  - Monitor uterine activity  - Medications as ordered  - Bedrest  Outcome: Progressing     Problem: BIRTH - VAGINAL/ SECTION  Goal: Fetal and maternal status remain reassuring during the birth process  Description: INTERVENTIONS:  - Monitor vital signs  - Monitor fetal heart rate  - Monitor uterine activity  - Monitor labor progression (vaginal delivery)  - DVT prophylaxis  - Antibiotic prophylaxis  Outcome: Progressing  Goal: Emotionally satisfying birthing experience for mother/fetus  Description: Interventions:  - Assess, plan, implement and evaluate the nursing care given to the patient in labor  - Advocate the philosophy that each childbirth experience is a unique experience and support the family's chosen level of involvement and control during the labor process   - Actively participate in both the patient's and family's teaching of the birth process  - Consider cultural, Sikh and age-specific factors and plan care for the patient in labor  Outcome: Progressing     Problem: Knowledge Deficit  Goal: Verbalizes understanding of labor plan  Description: Assess patient/family/caregiver's baseline knowledge level and ability to understand information. Provide education via patient/family/caregiver's preferred learning method at appropriate level of understanding. 1. Provide teaching at level of understanding. 2. Provide teaching via preferred learning method(s). Outcome: Progressing  Goal: Patient/family/caregiver demonstrates understanding of disease process, treatment plan, medications, and discharge instructions  Description: Complete learning assessment and assess knowledge base.   Interventions:  - Provide teaching at level of understanding  - Provide teaching via preferred learning methods  Outcome: Progressing Problem: Labor & Delivery  Goal: Manages discomfort  Description: Assess and monitor for signs and symptoms of discomfort. Assess patient's pain level regularly and per hospital policy. Administer medications as ordered. Support use of nonpharmacological methods to help control pain such as distraction, imagery, relaxation, and application of heat and cold. Collaborate with interdisciplinary team and patient to determine appropriate pain management plan. 1. Include patient in decisions related to comfort. 2. Offer non-pharmacological pain management interventions. 3. Report ineffective pain management to physician. Outcome: Progressing  Goal: Patient vital signs are stable  Description: 1. Assess vital signs - vaginal delivery.   Outcome: Progressing     Problem: PAIN - ADULT  Goal: Verbalizes/displays adequate comfort level or baseline comfort level  Description: Interventions:  - Encourage patient to monitor pain and request assistance  - Assess pain using appropriate pain scale  - Administer analgesics based on type and severity of pain and evaluate response  - Implement non-pharmacological measures as appropriate and evaluate response  - Consider cultural and social influences on pain and pain management  - Notify physician/advanced practitioner if interventions unsuccessful or patient reports new pain  Outcome: Progressing     Problem: INFECTION - ADULT  Goal: Absence or prevention of progression during hospitalization  Description: INTERVENTIONS:  - Assess and monitor for signs and symptoms of infection  - Monitor lab/diagnostic results  - Monitor all insertion sites, i.e. indwelling lines, tubes, and drains  - Monitor endotracheal if appropriate and nasal secretions for changes in amount and color  - [place] appropriate cooling/warming therapies per order  - Administer medications as ordered  - Instruct and encourage patient and family to use good hand hygiene technique  - Identify and instruct in appropriate isolation precautions for identified infection/condition  Outcome: Progressing  Goal: Absence of fever/infection during neutropenic period  Description: INTERVENTIONS:  - Monitor WBC    Outcome: Progressing     Problem: SAFETY ADULT  Goal: Patient will remain free of falls  Description: INTERVENTIONS:  - Educate patient/family on patient safety including physical limitations  - Instruct patient to call for assistance with activity   - Consult OT/PT to assist with strengthening/mobility   - Keep Call bell within reach  - Keep bed low and locked with side rails adjusted as appropriate  - Keep care items and personal belongings within reach  - Initiate and maintain comfort rounds  - Make Fall Risk Sign visible to staff  - Offer Toileting every  Hours, in advance of need  - Initiate/Maintain alarm  - Obtain necessary fall risk management equipment:   - Apply yellow socks and bracelet for high fall risk patients  - Consider moving patient to room near nurses station  Outcome: Progressing  Goal: Maintain or return to baseline ADL function  Description: INTERVENTIONS:  -  Assess patient's ability to carry out ADLs; assess patient's baseline for ADL function and identify physical deficits which impact ability to perform ADLs (bathing, care of mouth/teeth, toileting, grooming, dressing, etc.)  - Assess/evaluate cause of self-care deficits   - Assess range of motion  - Assess patient's mobility; develop plan if impaired  - Assess patient's need for assistive devices and provide as appropriate  - Encourage maximum independence but intervene and supervise when necessary  - Involve family in performance of ADLs  - Assess for home care needs following discharge   - Consider OT consult to assist with ADL evaluation and planning for discharge  - Provide patient education as appropriate  Outcome: Progressing  Goal: Maintains/Returns to pre admission functional level  Description: INTERVENTIONS:  - Perform BMAT or MOVE assessment daily.   - Set and communicate daily mobility goal to care team and patient/family/caregiver. - Collaborate with rehabilitation services on mobility goals if consulted  - Perform Range of Motion  times a day. - Reposition patient every  hours.   - Dangle patient  times a day  - Stand patient  times a day  - Ambulate patient  times a day  - Out of bed to chair  times a day   - Out of bed for meals times a day  - Out of bed for toileting  - Record patient progress and toleration of activity level   Outcome: Progressing     Problem: DISCHARGE PLANNING  Goal: Discharge to home or other facility with appropriate resources  Description: INTERVENTIONS:  - Identify barriers to discharge w/patient and caregiver  - Arrange for needed discharge resources and transportation as appropriate  - Identify discharge learning needs (meds, wound care, etc.)  - Arrange for interpretive services to assist at discharge as needed  - Refer to Case Management Department for coordinating discharge planning if the patient needs post-hospital services based on physician/advanced practitioner order or complex needs related to functional status, cognitive ability, or social support system  Outcome: Progressing

## 2023-09-23 NOTE — OB LABOR/OXYTOCIN SAFETY PROGRESS
Labor Progress Note - Gabbie Norris 20 y.o. female MRN: [id number]    Unit/Bed#: -01 Encounter: [id number]       Contraction Frequency (minutes): 3  Contraction Quality: Moderate  Tachysystole: No   Cervical Dilation: Lip/rim (Comment)        Cervical Effacement: 100  Fetal Station: 0  Baseline Rate: 130 bpm  Fetal Heart Rate: 140 BPM  FHR Category: II               Vital Signs:   Vitals:    09/23/23 1433   BP: 135/86   Pulse:    Resp:    Temp:    SpO2:        Notes/comments: Patient feeling increased pressure and need to push. Cervical exam as above. FHT category II with recurrent variable decelerations with contractions. Moderate variability and accelerations present. Plan to start pushing. Dr. Kimberlyn Echevarria aware.          Dino Trinidad MD 9/23/2023 2:43 PM

## 2023-09-23 NOTE — PLAN OF CARE
Problem: ANTEPARTUM  Goal: Maintain pregnancy as long as maternal and/or fetal condition is stable  Description: INTERVENTIONS:  - Maternal surveillance  - Fetal surveillance  - Monitor uterine activity  - Medications as ordered  - Bedrest  Outcome: Progressing     Problem: BIRTH - VAGINAL/ SECTION  Goal: Fetal and maternal status remain reassuring during the birth process  Description: INTERVENTIONS:  - Monitor vital signs  - Monitor fetal heart rate  - Monitor uterine activity  - Monitor labor progression (vaginal delivery)  - DVT prophylaxis  - Antibiotic prophylaxis  Outcome: Progressing  Goal: Emotionally satisfying birthing experience for mother/fetus  Description: Interventions:  - Assess, plan, implement and evaluate the nursing care given to the patient in labor  - Advocate the philosophy that each childbirth experience is a unique experience and support the family's chosen level of involvement and control during the labor process   - Actively participate in both the patient's and family's teaching of the birth process  - Consider cultural, Jain and age-specific factors and plan care for the patient in labor  Outcome: Progressing     Problem: Knowledge Deficit  Goal: Verbalizes understanding of labor plan  Description: Assess patient/family/caregiver's baseline knowledge level and ability to understand information. Provide education via patient/family/caregiver's preferred learning method at appropriate level of understanding. 1. Provide teaching at level of understanding. 2. Provide teaching via preferred learning method(s). Outcome: Progressing  Goal: Patient/family/caregiver demonstrates understanding of disease process, treatment plan, medications, and discharge instructions  Description: Complete learning assessment and assess knowledge base.   Interventions:  - Provide teaching at level of understanding  - Provide teaching via preferred learning methods  Outcome: Progressing Problem: Labor & Delivery  Goal: Manages discomfort  Description: Assess and monitor for signs and symptoms of discomfort. Assess patient's pain level regularly and per hospital policy. Administer medications as ordered. Support use of nonpharmacological methods to help control pain such as distraction, imagery, relaxation, and application of heat and cold. Collaborate with interdisciplinary team and patient to determine appropriate pain management plan. 1. Include patient in decisions related to comfort. 2. Offer non-pharmacological pain management interventions. 3. Report ineffective pain management to physician. Outcome: Progressing  Goal: Patient vital signs are stable  Description: 1. Assess vital signs - vaginal delivery.   Outcome: Progressing     Problem: PAIN - ADULT  Goal: Verbalizes/displays adequate comfort level or baseline comfort level  Description: Interventions:  - Encourage patient to monitor pain and request assistance  - Assess pain using appropriate pain scale  - Administer analgesics based on type and severity of pain and evaluate response  - Implement non-pharmacological measures as appropriate and evaluate response  - Consider cultural and social influences on pain and pain management  - Notify physician/advanced practitioner if interventions unsuccessful or patient reports new pain  Outcome: Progressing     Problem: INFECTION - ADULT  Goal: Absence or prevention of progression during hospitalization  Description: INTERVENTIONS:  - Assess and monitor for signs and symptoms of infection  - Monitor lab/diagnostic results  - Monitor all insertion sites, i.e. indwelling lines, tubes, and drains  - Monitor endotracheal if appropriate and nasal secretions for changes in amount and color  - [place] appropriate cooling/warming therapies per order  - Administer medications as ordered  - Instruct and encourage patient and family to use good hand hygiene technique  - Identify and instruct in appropriate isolation precautions for identified infection/condition  Outcome: Progressing  Goal: Absence of fever/infection during neutropenic period  Description: INTERVENTIONS:  - Monitor WBC    Outcome: Progressing     Problem: SAFETY ADULT  Goal: Patient will remain free of falls  Description: INTERVENTIONS:  - Educate patient/family on patient safety including physical limitations  - Instruct patient to call for assistance with activity   - Consult OT/PT to assist with strengthening/mobility   - Keep Call bell within reach  - Keep bed low and locked with side rails adjusted as appropriate  - Keep care items and personal belongings within reach  - Initiate and maintain comfort rounds  - Make Fall Risk Sign visible to staff  - Offer Toileting every  Hours, in advance of need  - Initiate/Maintain alarm  - Obtain necessary fall risk management equipment:   - Apply yellow socks and bracelet for high fall risk patients  - Consider moving patient to room near nurses station  Outcome: Progressing  Goal: Maintain or return to baseline ADL function  Description: INTERVENTIONS:  -  Assess patient's ability to carry out ADLs; assess patient's baseline for ADL function and identify physical deficits which impact ability to perform ADLs (bathing, care of mouth/teeth, toileting, grooming, dressing, etc.)  - Assess/evaluate cause of self-care deficits   - Assess range of motion  - Assess patient's mobility; develop plan if impaired  - Assess patient's need for assistive devices and provide as appropriate  - Encourage maximum independence but intervene and supervise when necessary  - Involve family in performance of ADLs  - Assess for home care needs following discharge   - Consider OT consult to assist with ADL evaluation and planning for discharge  - Provide patient education as appropriate  Outcome: Progressing  Goal: Maintains/Returns to pre admission functional level  Description: INTERVENTIONS:  - Perform BMAT or MOVE assessment daily.   - Set and communicate daily mobility goal to care team and patient/family/caregiver.    - Collaborate with rehabilitation services on mobility goals if consulted  - Record patient progress and toleration of activity level   Outcome: Progressing     Problem: DISCHARGE PLANNING  Goal: Discharge to home or other facility with appropriate resources  Description: INTERVENTIONS:  - Identify barriers to discharge w/patient and caregiver  - Arrange for needed discharge resources and transportation as appropriate  - Identify discharge learning needs (meds, wound care, etc.)  - Arrange for interpretive services to assist at discharge as needed  - Refer to Case Management Department for coordinating discharge planning if the patient needs post-hospital services based on physician/advanced practitioner order or complex needs related to functional status, cognitive ability, or social support system  Outcome: Progressing

## 2023-09-23 NOTE — OB LABOR/OXYTOCIN SAFETY PROGRESS
Labor Progress Note - Talat Chester 20 y.o. female MRN: [id number]    Unit/Bed#: -01 Encounter: [id number]       Contraction Frequency (minutes): 3  Contraction Quality: Moderate  Tachysystole: No   Cervical Dilation: 7        Cervical Effacement: 90  Fetal Station: -1  Baseline Rate: 130 bpm  Fetal Heart Rate: 140 BPM  FHR Category: I               Vital Signs:  Vitals:    09/23/23 1400   BP: 133/80   Pulse:    Resp: 20   Temp: 98 °F (36.7 °C)   SpO2:        Notes/comments: Patient feeling increased pressure. Cervical exam as above. Will reassess in 2 hours or sooner if clinically indicated.          Adelfo Freeman MD 9/23/2023 2:34 PM

## 2023-09-23 NOTE — OB LABOR/OXYTOCIN SAFETY PROGRESS
Labor Progress Note - Mat Perrin 20 y.o. female MRN: [id number]    Unit/Bed#: -01 Encounter: [id number]       Contraction Frequency (minutes): 1-3  Contraction Quality: Mild  Tachysystole: No   Cervical Dilation: 3-4        Cervical Effacement: 60  Fetal Station: -3  Baseline Rate: 140 bpm  Fetal Heart Rate: 140 BPM  FHR Category: 2             Vital Signs:   Vitals:    09/23/23 0500   BP: 128/76   Pulse:    Resp:    Temp:    SpO2:        Patient resting comfortably in bed, feeling some of her contractions. SVE now 3.5/60/-3. FHT category 2 for occasional early and variable decelerations; otherwise baseline 140, moderate variability, +accels. [place] q1-3 minutes. Will continue with expectant management. D/w Dr. Kelli Yin.     Betsy Ag MD 9/23/2023 6:56 AM

## 2023-09-23 NOTE — PLAN OF CARE
Problem: PAIN - ADULT  Goal: Verbalizes/displays adequate comfort level or baseline comfort level  Description: Interventions:  - Encourage patient to monitor pain and request assistance  - Assess pain using appropriate pain scale  - Administer analgesics based on type and severity of pain and evaluate response  - Implement non-pharmacological measures as appropriate and evaluate response  - Consider cultural and social influences on pain and pain management  - Notify physician/advanced practitioner if interventions unsuccessful or patient reports new pain  Outcome: Progressing     Problem: INFECTION - ADULT  Goal: Absence or prevention of progression during hospitalization  Description: INTERVENTIONS:  - Assess and monitor for signs and symptoms of infection  - Monitor lab/diagnostic results  - Monitor all insertion sites, i.e. indwelling lines, tubes, and drains  - Monitor endotracheal if appropriate and nasal secretions for changes in amount and color  - [place] appropriate cooling/warming therapies per order  - Administer medications as ordered  - Instruct and encourage patient and family to use good hand hygiene technique  - Identify and instruct in appropriate isolation precautions for identified infection/condition  Outcome: Progressing  Goal: Absence of fever/infection during neutropenic period  Description: INTERVENTIONS:  - Monitor WBC    Outcome: Progressing     Problem: SAFETY ADULT  Goal: Patient will remain free of falls  Description: INTERVENTIONS:  - Educate patient/family on patient safety including physical limitations  - Instruct patient to call for assistance with activity   - Consult OT/PT to assist with strengthening/mobility   - Keep Call bell within reach  - Keep bed low and locked with side rails adjusted as appropriate  - Keep care items and personal belongings within reach  - Initiate and maintain comfort rounds  - Make Fall Risk Sign visible to staff  - Offer Toileting every  Hours, in advance of need  - Initiate/Maintain alarm  - Obtain necessary fall risk management equipment:   - Apply yellow socks and bracelet for high fall risk patients  - Consider moving patient to room near nurses station  Outcome: Progressing  Goal: Maintain or return to baseline ADL function  Description: INTERVENTIONS:  -  Assess patient's ability to carry out ADLs; assess patient's baseline for ADL function and identify physical deficits which impact ability to perform ADLs (bathing, care of mouth/teeth, toileting, grooming, dressing, etc.)  - Assess/evaluate cause of self-care deficits   - Assess range of motion  - Assess patient's mobility; develop plan if impaired  - Assess patient's need for assistive devices and provide as appropriate  - Encourage maximum independence but intervene and supervise when necessary  - Involve family in performance of ADLs  - Assess for home care needs following discharge   - Consider OT consult to assist with ADL evaluation and planning for discharge  - Provide patient education as appropriate  Outcome: Progressing  Goal: Maintains/Returns to pre admission functional level  Description: INTERVENTIONS:  - Perform BMAT or MOVE assessment daily.   - Set and communicate daily mobility goal to care team and patient/family/caregiver. - Collaborate with rehabilitation services on mobility goals if consulted  - Perform Range of Motion  times a day. - Reposition patient every  hours.   - Dangle patient  times a day  - Stand patient  times a day  - Ambulate patient  times a day  - Out of bed to chair  times a day   - Out of bed for meals times a day  - Out of bed for toileting  - Record patient progress and toleration of activity level   Outcome: Progressing     Problem: DISCHARGE PLANNING  Goal: Discharge to home or other facility with appropriate resources  Description: INTERVENTIONS:  - Identify barriers to discharge w/patient and caregiver  - Arrange for needed discharge resources and transportation as appropriate  - Identify discharge learning needs (meds, wound care, etc.)  - Arrange for interpretive services to assist at discharge as needed  - Refer to Case Management Department for coordinating discharge planning if the patient needs post-hospital services based on physician/advanced practitioner order or complex needs related to functional status, cognitive ability, or social support system  Outcome: Progressing

## 2023-09-24 VITALS
WEIGHT: 162 LBS | HEART RATE: 64 BPM | TEMPERATURE: 98.1 F | RESPIRATION RATE: 18 BRPM | BODY MASS INDEX: 27.66 KG/M2 | OXYGEN SATURATION: 98 % | DIASTOLIC BLOOD PRESSURE: 88 MMHG | HEIGHT: 64 IN | SYSTOLIC BLOOD PRESSURE: 144 MMHG

## 2023-09-24 PROCEDURE — NC001 PR NO CHARGE: Performed by: STUDENT IN AN ORGANIZED HEALTH CARE EDUCATION/TRAINING PROGRAM

## 2023-09-24 PROCEDURE — 99024 POSTOP FOLLOW-UP VISIT: CPT | Performed by: STUDENT IN AN ORGANIZED HEALTH CARE EDUCATION/TRAINING PROGRAM

## 2023-09-24 RX ORDER — IBUPROFEN 600 MG/1
600 TABLET ORAL EVERY 6 HOURS
Qty: 30 TABLET | Refills: 0
Start: 2023-09-24

## 2023-09-24 RX ADMIN — DOCUSATE SODIUM 100 MG: 100 CAPSULE, LIQUID FILLED ORAL at 10:00

## 2023-09-24 RX ADMIN — ACETAMINOPHEN 975 MG: 325 TABLET, FILM COATED ORAL at 14:05

## 2023-09-24 RX ADMIN — IBUPROFEN 600 MG: 600 TABLET ORAL at 10:00

## 2023-09-24 NOTE — PROGRESS NOTES
Obstetrics Progress Note  Martha Michel 20 y.o. female MRN: [id number]  Unit/Bed#: -01 Encounter: [id number]    Assessment/Plan:  Postpartum Day #1 s/p . Stable. Baby in room. By issue:  *  (spontaneous vaginal delivery)  Assessment & Plan  • Routine postpartum care  • Encourage ambulation  • Encourage familial bonding  • Lactation support as needed  • Pain: Motrin/Tylenol around the clock  • Voiding spontaneously  • DVT Ppx: ambulation  • Patient would like to stay today for monitoring of baby    Anticipate discharge   Subjective/Objective   Chief Complaint:   Post delivery     Subjective:   Pain: controlled. Tolerating PO: yes. Voiding: yes. Flatus: yes. Ambulating: yes. Chest pain: no. Shortness of breath: no. Leg pain: no. Lochia: within normal limits. Infant feeding: bottle. Patient would like to stay an additional day for monitoring of baby. Objective:   Vitals:   Temp:  [97.9 °F (36.6 °C)-98.9 °F (37.2 °C)] 98.4 °F (36.9 °C)  HR:  [60-86] 66  Resp:  [16-20] 18  BP: (115-138)/(64-86) 127/74       Intake/Output Summary (Last 24 hours) at 2023 0736  Last data filed at 2023 0115  Gross per 24 hour   Intake --   Output 825 ml   Net -825 ml       Lab Results   Component Value Date    WBC 10.93 (H) 2023    HGB 11.3 (L) 2023    HCT 34.6 (L) 2023    MCV 85 2023     2023       Physical Exam:   General: alert and oriented x3, in no apparent distress  Cardiovascular: regular rate   Pulmonary: normal effort  Abdomen: Soft, non-tender, non-distended, no rebound or guarding.  Uterine fundus firm and non-tender, at the umbilicus   Extremities: Non tender    May Lamb MD  PGY-I, OBGYN  2023, 7:36 AM

## 2023-09-24 NOTE — LACTATION NOTE
This note was copied from a baby's chart. CONSULT - LACTATION  Baby Boy Marielena LundborgAlexandra Guido 1 days male MRN: [id number]    [address] [facility] Room / Bed: (N)/(N) Encounter: [id number]    Maternal Information     MOTHER:  Yasmin Guido  Maternal Age: 20 y.o.   OB History: # 1 - Date: 2019, Sex: Male, Weight: None, GA: None, Delivery: Vaginal, Spontaneous, Apgar1: None, Apgar5: None, Living: Living, Birth Comments: None    # 2 - Date: 23, Sex: Male, Weight: 2955 g (6 lb 8.2 oz), GA: 38w5d, Delivery: Vaginal, Spontaneous, Apgar1: 8, Apgar5: 9, Living: Living, Birth Comments: None   Previouse breast reduction surgery? No    Lactation history:   Has patient previously breast fed: How long had patient previously breast fed:     Previous breast feeding complications:     History reviewed. No pertinent surgical history. Birth information:  YOB: 2024   Time of birth: 3:15 PM   Sex: male   Delivery type: Vaginal, Spontaneous   Birth Weight: 2955 g (6 lb 8.2 oz)   Percent of Weight Change: -1%     Gestational Age: 40w9d   [unfilled]    Assessment     Breast and nipple assessment: normal assessment    [place] Assessment: normal assessment    Feeding assessment: Not assessed. LATCH:  Latch: Audible Swallowing:    Type of Nipple:    Comfort (Breast/Nipple):    Hold (Positioning):    LATCH Score:         Feeding recommendations:  breast feed on demand     Met with Mert this afternoon. Yasmin speaks Korean with a little bit of english and Idris Nunez speaks both languages fluently. Yasmin declined use of [employer] as she preferred Idris Nunez to translate. All written information was supplied in [employer] and [employer]. Yasmin's feeding intent is breast and formula. She states that baby has been latching well but she is having some nipple tenderness. Lanolin was provided with instructions on use.    Parents states that they tried to give baby a bottle/formula last evening but that their baby boy prefers the breast. Reassured mom that, that is a good thing as it will help establish her milk supply. Instructed her to call at next feeding for a latch assessment. Lactation Phone Number was provided. Also provided parents with the  Ready Set Baby and the Discharge Breastfeeding Booklets in [address] [place] [address] and briefly reviewed information. Discussed Skin to Skin contact and benefits to mom and baby. Feeding cues and what that means for recognizing infant's hunger reviewed. Positioning and latch reviewed as well as showing images of other feeding positions. Reviewed hand/manual expression. Gave information on common concerns, what to expect the first few weeks after delivery, preparing for other caregivers, and how partners can help. Resources for support also provided. The feeding log was also reviewed, emphasized 8 or more (12) feedings in a 24 hour period and what to expect for the number of diapers per day of life. Breastfeeding and your lifestyle, storage and preparation of breast milk, how to keep you breast pump clean, the employed breastfeeding mother and paced bottle feeding handouts also provided. Booklet included Breastfeeding Resources for after discharge including access to the number for the [address] for follow up breastfeeding support as needed. Jannette Hoffman with a hand pump as her health insurance is pending. She has an appointment with [address] [place] [address] on Wednesday 09/27/2023.     Max Zarate RN 9/24/2023 2:24 PM

## 2023-09-24 NOTE — ASSESSMENT & PLAN NOTE
• Routine postpartum care  • Encourage ambulation  • Encourage familial bonding  • Lactation support as needed  • Pain: Motrin/Tylenol around the clock  • Voiding spontaneously  • DVT Ppx: ambulation  • Patient would like to stay today for monitoring of baby

## 2023-09-24 NOTE — LACTATION NOTE
This note was copied from a baby's chart. Yasmin called out for a latch assessment, she positioned baby in the cradle position to latch baby. Latch was noted to be shallow. Showed mom how to break suction and reviewed the [employer] and [employer] holds with parents. Yasmin wanted to try cross cradle. Helped mom position her baby up at chest level (using pillow support). Stressed importance of good alignment ( baby's ear, shoulder and hip in a straight line ) and bringing baby to breast and not breast to baby ( no hunching over). Then align nose to nipple begin stroking nipple to chin to achieve a wide open mouth. Baby's lower lip and chin touching the breast with nipple aimed up towards the roof of baby's mouth so tongue is pressed down to prevent baby from pushing off nipple and using areolar compression to achieve a deep latch that is comfortable and exchanges optimum amounts of colostrum. Mom reported a more comfortable latch using this position. Parents may be being discharged this evening to home with their baby. Additional questions were answered and encouraged them to reach out to the Baby and [facility] [place] for follow up breastfeeding support as needed.

## 2023-09-24 NOTE — PLAN OF CARE
Problem: PAIN - ADULT  Goal: Verbalizes/displays adequate comfort level or baseline comfort level  Description: Interventions:  - Encourage patient to monitor pain and request assistance  - Assess pain using appropriate pain scale  - Administer analgesics based on type and severity of pain and evaluate response  - Implement non-pharmacological measures as appropriate and evaluate response  - Consider cultural and social influences on pain and pain management  - Notify physician/advanced practitioner if interventions unsuccessful or patient reports new pain  Outcome: Progressing     Problem: INFECTION - ADULT  Goal: Absence or prevention of progression during hospitalization  Description: INTERVENTIONS:  - Assess and monitor for signs and symptoms of infection  - Monitor lab/diagnostic results  - Monitor all insertion sites, i.e. indwelling lines, tubes, and drains  - Monitor endotracheal if appropriate and nasal secretions for changes in amount and color  - [place] appropriate cooling/warming therapies per order  - Administer medications as ordered  - Instruct and encourage patient and family to use good hand hygiene technique  - Identify and instruct in appropriate isolation precautions for identified infection/condition  Outcome: Progressing  Goal: Absence of fever/infection during neutropenic period  Description: INTERVENTIONS:  - Monitor WBC    Outcome: Progressing     Problem: SAFETY ADULT  Goal: Patient will remain free of falls  Description: INTERVENTIONS:  - Educate patient/family on patient safety including physical limitations  - Instruct patient to call for assistance with activity   - Consult OT/PT to assist with strengthening/mobility   - Keep Call bell within reach  - Keep bed low and locked with side rails adjusted as appropriate  - Keep care items and personal belongings within reach  - Initiate and maintain comfort rounds  - Make Fall Risk Sign visible to staff  - Apply yellow socks and bracelet for high fall risk patients  - Consider moving patient to room near nurses station  Outcome: Progressing  Goal: Maintain or return to baseline ADL function  Description: INTERVENTIONS:  -  Assess patient's ability to carry out ADLs; assess patient's baseline for ADL function and identify physical deficits which impact ability to perform ADLs (bathing, care of mouth/teeth, toileting, grooming, dressing, etc.)  - Assess/evaluate cause of self-care deficits   - Assess range of motion  - Assess patient's mobility; develop plan if impaired  - Assess patient's need for assistive devices and provide as appropriate  - Encourage maximum independence but intervene and supervise when necessary  - Involve family in performance of ADLs  - Assess for home care needs following discharge   - Consider OT consult to assist with ADL evaluation and planning for discharge  - Provide patient education as appropriate  Outcome: Progressing  Goal: Maintains/Returns to pre admission functional level  Description: INTERVENTIONS:  - Perform BMAT or MOVE assessment daily.   - Set and communicate daily mobility goal to care team and patient/family/caregiver.    - Out of bed for toileting  - Record patient progress and toleration of activity level   Outcome: Progressing     Problem: DISCHARGE PLANNING  Goal: Discharge to home or other facility with appropriate resources  Description: INTERVENTIONS:  - Identify barriers to discharge w/patient and caregiver  - Arrange for needed discharge resources and transportation as appropriate  - Identify discharge learning needs (meds, wound care, etc.)  - Arrange for interpretive services to assist at discharge as needed  - Refer to Case Management Department for coordinating discharge planning if the patient needs post-hospital services based on physician/advanced practitioner order or complex needs related to functional status, cognitive ability, or social support system  Outcome: Progressing

## 2023-09-27 ENCOUNTER — TELEPHONE (OUTPATIENT)
Dept: OBGYN CLINIC | Facility: CLINIC | Age: 23
End: 2023-09-27

## 2023-09-27 NOTE — TELEPHONE ENCOUNTER
Pt called back [phone] and spoke to KADY Mukherjee MA via [email or web address] [facility] Post Partum phone call: Congratulations Date of delivery: 2023  Weeks gestation: full term 38 weeks  Type of delivery: vaginal delivery  Sex of child: male  Name of child:   Birth weight: 2955 gm   6lbs 8.2ounces  Perineum laceration: No   Pediatric provider:  Vaginal bleeding status: light  Urinary status: voiding WNL   Bowel movement: Yes  Incision status: NA   Pain control: acetaminophen   feeding: Breastfeeding and Bottle feeding   Any baby blues: No  Scheduled for follow-up appointment: 2023

## 2023-09-27 NOTE — TELEPHONE ENCOUNTER
JUAN DAVID via [address] [facility] for pt to call back [phone] to schedule post partum appt on or after 10/16/2023 and complete TCM.

## 2023-09-29 LAB — PLACENTA IN STORAGE: NORMAL

## 2023-10-16 ENCOUNTER — POSTPARTUM VISIT (OUTPATIENT)
Dept: OBGYN CLINIC | Facility: CLINIC | Age: 23
End: 2023-10-16

## 2023-10-16 VITALS
HEART RATE: 77 BPM | SYSTOLIC BLOOD PRESSURE: 116 MMHG | BODY MASS INDEX: 24.41 KG/M2 | HEIGHT: 64 IN | WEIGHT: 143 LBS | DIASTOLIC BLOOD PRESSURE: 82 MMHG

## 2023-10-16 NOTE — PROGRESS NOTES
Joyce Calix is a 20 y.o. y.o. female  who presents for a postpartum visit. She is 3 weeks postpartum following a spontaneous vaginal delivery on 23, she had a baby boy that weighed 6 lbs 8.2 oz, apgars 8 and 9 . Pregnancy complicated by positive chlamydia test, negative culture 23. She had no lacerations. Left breast pain yesterday, has improved with massage, she denies any redness. Reports she feeds baby more frequently on her right breast than on her left breast.  Reviewed position change when feeding on the left side also reviewed can also pump. . I have fully reviewed the prenatal and intrapartum course. The delivery was at 38 wk 5 dgestational weeks. Outcome: spontaneous vaginal delivery. Anesthesia: none. Postpartum course has been uneventful. Baby's course has been has a rash on face. Erica Mcclure is feeding by both breast and bottle - Similac Advance. Bleeding no bleeding. Bowel function is normal. Bladder function is normal. Patient is sexually active, but has not resumed sex yet. Contraception method is desires Nexplanon, has used in the past without concernsnone. Postpartum depression screening: negative. She scored a 2 on depression screen. The following portions of the patient's history were reviewed and updated as appropriate: allergies, current medications, past family history, past medical history, past social history, past surgical history, and problem list.    Review of Systems  Pertinent items are noted in HPI. .    Objective     /82   Pulse 77   Ht 5' 4" (1.626 m)   Wt 64.9 kg (143 lb)   LMP 2022 (Within Months)   BMI 24.55 kg/m²     General:   appears stated age, cooperative, alert normal mood and affect   Neck: Neck: normal, supple,trachea midline, no masses   Heart: regular rate and rhythm, S1, S2 normal, no murmur, click, rub or gallop   Lungs: clear to auscultation bilaterally   Breasts: No erythema in either breast.  Left breast with likely clogged duct upper outer area, has improved with massage.,  No erythema   Abdomen: soft, non-tender, without masses or organomegaly   Vulva: Deferred, denies concerns   Vagina:    Urethra:    Cervix:    Uterus:    Adnexa:      Assessment/Plan     3 wks postpartum exam. Pap smear not done at today's visit. Last pap was 3/2/23 and was negative. 1. Contraception: none, desires Nexplanon, has used in the past without concerns  2. Annual due 3/2/24  3. Follow up in: 1 week for Nexplanon insertion or as needed. They are of irregular bleeding pattern. Reviewed to abstain from sex until placed and then also backup method used for 1 week postplacement. 4.  Likely clogged duct left breast, reviewed massage, heat, reviewed precautions call with any redness swelling, flulike symptoms. Reviewed to continue breast-feeding and pumping.

## 2023-10-23 ENCOUNTER — PROCEDURE VISIT (OUTPATIENT)
Dept: OBGYN CLINIC | Facility: CLINIC | Age: 23
End: 2023-10-23

## 2023-10-23 VITALS
HEART RATE: 66 BPM | HEIGHT: 64 IN | WEIGHT: 137.2 LBS | BODY MASS INDEX: 23.42 KG/M2 | SYSTOLIC BLOOD PRESSURE: 127 MMHG | DIASTOLIC BLOOD PRESSURE: 84 MMHG

## 2023-10-23 DIAGNOSIS — Z32.02 PREGNANCY TEST NEGATIVE: ICD-10-CM

## 2023-10-23 DIAGNOSIS — Z30.017 NEXPLANON INSERTION: Primary | ICD-10-CM

## 2023-10-23 PROBLEM — Z34.93 PRENATAL CARE, THIRD TRIMESTER: Status: RESOLVED | Noted: 2023-07-27 | Resolved: 2023-10-23

## 2023-10-23 LAB — SL AMB POCT URINE HCG: NEGATIVE

## 2023-10-23 NOTE — PROGRESS NOTES
Universal Protocol:  Consent: Verbal consent obtained. Written consent obtained. Risks and benefits discussed:  657004 used, reviewed risk of pain, infection, bruising, risk of migration, risk of failure 1-2/100, risk of paresthesias, risk of difficult removal.  Consent given by: patient  Patient understanding: patient states understanding of the procedure being performed  Patient consent: the patient's understanding of the procedure matches consent given  Procedure consent: procedure consent matches procedure scheduled  Test results: test results available and properly labeled  Site marked: the operative site was marked  Patient identity confirmed: verbally with patient  Remove and insert drug implant    Date/Time: 10/23/2023 11:30 AM    Performed by: JOE Kessler  Authorized by: JOE Kessler    Indication:     Indication: Insertion of non-biodegradable drug delivery implant    Pre-procedure:     Prepped with: alcohol 70%      Local anesthetic:  Lidocaine without epinephrine    The site was cleaned and prepped in a sterile fashion: yes    Procedure:     Procedure: Insertion    Small stab incision was made in arm: no      Left/right:  Left    Preloaded contraceptive capsule trocar was placed subdermally: yes      Visualization of implant was obtained: yes      Contraceptive capsule was inserted and trocar removed: yes      Visualization of notch in stylet and palpation of device: yes      Palpation confirms placement by provider and patient: yes      Site was closed with steri-strips and pressure bandage applied: yes    Comments:      Reviewed home care post procedure and also placed on AVS.  Return to office in 3 weeks for Nexplanon check. Reviewed condoms x1 week. Call with any concerns

## 2023-10-23 NOTE — PATIENT INSTRUCTIONS
Call with heavy menstrual bleeding, pain, swelling of arm, or fever  May ice arm for 24 hours  Keep kerlex wrap on for 24 hours  Keep steri strips and large bandaid on for 3-5 days  Avoid showering for today  Avoid heavy lifting for the next few days  RTO if any concerns  Condoms for 1 week

## 2023-11-13 ENCOUNTER — OFFICE VISIT (OUTPATIENT)
Dept: OBGYN CLINIC | Facility: CLINIC | Age: 23
End: 2023-11-13

## 2023-11-13 VITALS
BODY MASS INDEX: 23.34 KG/M2 | DIASTOLIC BLOOD PRESSURE: 80 MMHG | HEART RATE: 82 BPM | RESPIRATION RATE: 16 BRPM | SYSTOLIC BLOOD PRESSURE: 126 MMHG | WEIGHT: 136 LBS

## 2023-11-13 DIAGNOSIS — Z78.9 LANGUAGE BARRIER: ICD-10-CM

## 2023-11-13 DIAGNOSIS — Z97.5 NEXPLANON IN PLACE: Primary | ICD-10-CM

## 2023-11-13 PROBLEM — O99.519 ASTHMA DURING PREGNANCY: Status: RESOLVED | Noted: 2023-08-24 | Resolved: 2023-11-13

## 2023-11-13 PROBLEM — J45.909 ASTHMA DURING PREGNANCY: Status: RESOLVED | Noted: 2023-08-24 | Resolved: 2023-11-13

## 2023-11-13 PROCEDURE — 99213 OFFICE O/P EST LOW 20 MIN: CPT | Performed by: NURSE PRACTITIONER

## 2023-11-13 NOTE — PROGRESS NOTES
Assessment/Plan:    No problem-specific Assessment & Plan notes found for this encounter. Annual due 3/2/2024     Diagnoses and all orders for this visit:    Vickie Bishop in place  Plan for patient to try using lotion on her arm for dry skin. RTO if any concerns  Language barrier          Subjective:      Patient ID: Sydney Alvarez is a 20 y.o. female. HPI 19 yo here Nexplanon check. It was placed 10/23/2023.  [id number] used. Patient reports amenorrheic current monthly. She denies any pelvic pain. She denies any pain in her left arm where the Nexplanon is placed. She does report she sometimes will get itching in that area she denies any redness or other concerns with her Nexplanon. She had an  , he was complicated by positive chlamydia test.she had a negative test 23. The following portions of the patient's history were reviewed and updated as appropriate: allergies, current medications, past family history, past medical history, past social history, past surgical history, and problem list.    Review of Systems   Constitutional:  Negative for chills and fever. Respiratory: Negative. Cardiovascular: Negative. Genitourinary:  Negative for dysuria, menstrual problem and vaginal discharge. Objective:      /80 (BP Location: Right arm, Patient Position: Sitting, Cuff Size: Standard)   Pulse 82   Resp 16   Wt 61.7 kg (136 lb)   Breastfeeding No   BMI 23.34 kg/m²          Physical Exam  Constitutional:       Appearance: Normal appearance. Cardiovascular:      Rate and Rhythm: Normal rate.    Pulmonary:      Effort: Pulmonary effort is normal.         Nexplanon palpable left upper arm, no erythema or swelling

## 2023-12-18 ENCOUNTER — TELEPHONE (OUTPATIENT)
Dept: FAMILY MEDICINE CLINIC | Facility: CLINIC | Age: 23
End: 2023-12-18

## 2024-03-04 PROBLEM — Z01.419 ENCOUNTER FOR GYNECOLOGICAL EXAMINATION WITHOUT ABNORMAL FINDING: Status: ACTIVE | Noted: 2024-03-04

## 2024-04-06 ENCOUNTER — HOSPITAL ENCOUNTER (EMERGENCY)
Facility: HOSPITAL | Age: 24
Discharge: HOME | End: 2024-04-06
Attending: EMERGENCY MEDICINE
Payer: COMMERCIAL

## 2024-04-06 VITALS
HEART RATE: 104 BPM | DIASTOLIC BLOOD PRESSURE: 88 MMHG | RESPIRATION RATE: 18 BRPM | TEMPERATURE: 98.3 F | OXYGEN SATURATION: 99 % | SYSTOLIC BLOOD PRESSURE: 136 MMHG

## 2024-04-06 DIAGNOSIS — J06.9 URI (UPPER RESPIRATORY INFECTION): ICD-10-CM

## 2024-04-06 DIAGNOSIS — H66.91 RIGHT OTITIS MEDIA: Primary | ICD-10-CM

## 2024-04-06 LAB
FLUAV RNA RESP QL NAA+PROBE: NEGATIVE
FLUBV RNA RESP QL NAA+PROBE: NEGATIVE
RSV RNA RESP QL NAA+PROBE: NEGATIVE
S PYO DNA THROAT QL NAA+PROBE: NOT DETECTED
SARS-COV-2 RNA RESP QL NAA+PROBE: NEGATIVE

## 2024-04-06 PROCEDURE — 99284 EMERGENCY DEPT VISIT MOD MDM: CPT | Performed by: EMERGENCY MEDICINE

## 2024-04-06 PROCEDURE — 0241U HB NFCT DS VIR RESP RNA 4 TRGT: CPT

## 2024-04-06 PROCEDURE — 99282 EMERGENCY DEPT VISIT SF MDM: CPT

## 2024-04-06 PROCEDURE — 87651 STREP A DNA AMP PROBE: CPT

## 2024-04-06 PROCEDURE — 96372 THER/PROPH/DIAG INJ SC/IM: CPT

## 2024-04-06 RX ORDER — NAPROXEN 500 MG/1
500 TABLET ORAL 2 TIMES DAILY WITH MEALS
Qty: 30 TABLET | Refills: 0 | Status: SHIPPED | OUTPATIENT
Start: 2024-04-06

## 2024-04-06 RX ORDER — AMOXICILLIN AND CLAVULANATE POTASSIUM 875; 125 MG/1; MG/1
1 TABLET, FILM COATED ORAL EVERY 12 HOURS
Qty: 14 TABLET | Refills: 0 | Status: SHIPPED | OUTPATIENT
Start: 2024-04-06 | End: 2024-04-13

## 2024-04-06 RX ORDER — KETOROLAC TROMETHAMINE 30 MG/ML
15 INJECTION, SOLUTION INTRAMUSCULAR; INTRAVENOUS ONCE
Status: COMPLETED | OUTPATIENT
Start: 2024-04-06 | End: 2024-04-06

## 2024-04-06 RX ORDER — AMOXICILLIN AND CLAVULANATE POTASSIUM 875; 125 MG/1; MG/1
1 TABLET, FILM COATED ORAL ONCE
Status: COMPLETED | OUTPATIENT
Start: 2024-04-06 | End: 2024-04-06

## 2024-04-06 RX ORDER — NAPROXEN 250 MG/1
500 TABLET ORAL ONCE
Status: COMPLETED | OUTPATIENT
Start: 2024-04-06 | End: 2024-04-06

## 2024-04-06 RX ADMIN — NAPROXEN 500 MG: 250 TABLET ORAL at 01:08

## 2024-04-06 RX ADMIN — AMOXICILLIN AND CLAVULANATE POTASSIUM 1 TABLET: 875; 125 TABLET, FILM COATED ORAL at 01:29

## 2024-04-06 RX ADMIN — KETOROLAC TROMETHAMINE 15 MG: 30 INJECTION, SOLUTION INTRAMUSCULAR; INTRAVENOUS at 02:33

## 2024-04-06 NOTE — DISCHARGE INSTRUCTIONS
Continue augmentin for next 7 days, take naproxen and tylenol for pain. Continue to rehydrate. Please return to the ED with new/worsening symptoms including trouble breathing, weakness, nausea, vomiting, abdominal pain, severe headache, etc.

## 2024-04-06 NOTE — ED PROVIDER NOTES
History  Chief Complaint   Patient presents with    Earache     Right ear pain, sore throat, cough, chills since yesterday morning. Denies chest pain. Last tylenol yesterday.     HPI The patient is a 23-year-old female presented to ED with right ear pain, sore throat, right jaw pain beginning yesterday morning.  Child at home is also sick.  Took Tylenol with some relief yesterday.  She has also had an associated cough intermittently.  No measured fevers.  Past medical history consistent of asthma, varicella as a child.  Denies current pregnancy.  No dental pain, tongue swelling, airway involvement, drooling, abdominal pain, nausea/vomiting/diarrhea, focal weakness, headache.    Prior to Admission Medications   Prescriptions Last Dose Informant Patient Reported? Taking?   Prenatal Vit-Fe Fumarate-FA (PRENATAL 1+1 PO)  Self Yes No   Sig: Take by mouth   acetaminophen (TYLENOL) 325 mg tablet  Self No No   Sig: Take 2 tablets (650 mg total) by mouth every 6 (six) hours as needed for mild pain, headaches or fever   Patient not taking: Reported on 11/13/2023   albuterol (ProAir HFA) 90 mcg/act inhaler  Self No No   Sig: Inhale 2 puffs every 6 (six) hours as needed for wheezing or shortness of breath   Patient not taking: Reported on 9/13/2023   benzocaine-menthol-lanolin-aloe (DERMOPLAST) 20-0.5 % topical spray  Self No No   Sig: Apply 1 Application topically every 6 (six) hours as needed for mild pain   Patient not taking: Reported on 10/16/2023   ibuprofen (MOTRIN) 600 mg tablet  Self No No   Sig: Take 1 tablet (600 mg total) by mouth every 6 (six) hours   Patient not taking: Reported on 10/16/2023   witch hazel-glycerin (TUCKS) topical pad  Self No No   Sig: Apply 1 Pad topically every 4 (four) hours as needed for irritation   Patient not taking: Reported on 10/16/2023      Facility-Administered Medications: None       Past Medical History:   Diagnosis Date    Asthma     as a child    Asthma during pregnancy 08/24/2023     Varicella     hx as a child       History reviewed. No pertinent surgical history.    Family History   Problem Relation Age of Onset    No Known Problems Mother     No Known Problems Father     No Known Problems Sister     No Known Problems Brother     No Known Problems Son     No Known Problems Maternal Grandmother     No Known Problems Maternal Grandfather     No Known Problems Paternal Grandmother     No Known Problems Paternal Grandfather      I have reviewed and agree with the history as documented.    E-Cigarette/Vaping    E-Cigarette Use Never User      E-Cigarette/Vaping Substances    Nicotine No     THC No     CBD No     Flavoring No     Other No     Unknown No      Social History     Tobacco Use    Smoking status: Never     Passive exposure: Past    Smokeless tobacco: Never   Vaping Use    Vaping status: Never Used   Substance Use Topics    Alcohol use: Not Currently    Drug use: Never        Review of Systems   Constitutional:  Positive for fatigue.   HENT:  Positive for congestion, ear pain (right) and sore throat.    Respiratory:  Positive for cough.    All other systems reviewed and are negative.      Physical Exam  ED Triage Vitals [04/06/24 0039]   Temperature Pulse Respirations Blood Pressure SpO2   98.3 °F (36.8 °C) 104 18 136/88 99 %      Temp Source Heart Rate Source Patient Position - Orthostatic VS BP Location FiO2 (%)   Oral Monitor Sitting Right arm --      Pain Score       10 - Worst Possible Pain             Orthostatic Vital Signs  Vitals:    04/06/24 0039   BP: 136/88   Pulse: 104   Patient Position - Orthostatic VS: Sitting       Physical Exam  Vitals and nursing note reviewed.   Constitutional:       General: She is in acute distress (mild, due to right ear pain).      Appearance: She is not toxic-appearing or diaphoretic.   HENT:      Head: Normocephalic and atraumatic.      Left Ear: Tympanic membrane, ear canal and external ear normal.      Ears:      Comments: Right TM  erythematous. No purulence.     Nose: Nose normal.      Mouth/Throat:      Mouth: Mucous membranes are moist.      Pharynx: Oropharynx is clear. Posterior oropharyngeal erythema present. No oropharyngeal exudate.   Eyes:      General: No scleral icterus.        Right eye: No discharge.         Left eye: No discharge.      Extraocular Movements: Extraocular movements intact.      Conjunctiva/sclera: Conjunctivae normal.      Pupils: Pupils are equal, round, and reactive to light.   Cardiovascular:      Rate and Rhythm: Regular rhythm. Tachycardia present.      Pulses: Normal pulses.      Heart sounds: Normal heart sounds.   Pulmonary:      Effort: Pulmonary effort is normal. No respiratory distress.      Breath sounds: Normal breath sounds. No stridor. No wheezing, rhonchi or rales.   Chest:      Chest wall: No tenderness.   Abdominal:      General: Abdomen is flat. Bowel sounds are normal. There is no distension.      Palpations: Abdomen is soft. There is no mass.      Tenderness: There is no abdominal tenderness. There is no right CVA tenderness, left CVA tenderness, guarding or rebound.      Hernia: No hernia is present.   Musculoskeletal:         General: No swelling, tenderness, deformity or signs of injury. Normal range of motion.      Cervical back: Normal range of motion and neck supple. No rigidity or tenderness.      Right lower leg: No edema.      Left lower leg: No edema.   Skin:     General: Skin is warm and dry.      Coloration: Skin is not jaundiced or pale.      Findings: No bruising, erythema, lesion or rash.   Neurological:      General: No focal deficit present.      Mental Status: She is alert and oriented to person, place, and time.      Cranial Nerves: No cranial nerve deficit.      Motor: No weakness.      Coordination: Coordination normal.      Gait: Gait normal.   Psychiatric:         Mood and Affect: Mood normal.         Behavior: Behavior normal.         ED Medications  Medications    naproxen (NAPROSYN) tablet 500 mg (500 mg Oral Given 4/6/24 0108)   amoxicillin-clavulanate (AUGMENTIN) 875-125 mg per tablet 1 tablet (1 tablet Oral Given 4/6/24 0129)   ketorolac (TORADOL) injection 15 mg (15 mg Intramuscular Given 4/6/24 0233)       Diagnostic Studies  Results Reviewed       Procedure Component Value Units Date/Time    FLU/RSV/COVID - if FLU/RSV clinically relevant [[id number]]  (Normal) Collected: 04/06/24 0108    Lab Status: Final result Specimen: Nares from Nose Updated: 04/06/24 0158     SARS-CoV-2 Negative     INFLUENZA A PCR Negative     INFLUENZA B PCR Negative     RSV PCR Negative    Narrative:      FOR PEDIATRIC PATIENTS - copy/paste COVID Guidelines URL to browser: [email or web address]    SARS-CoV-2 assay is a Nucleic Acid Amplification assay intended for the  qualitative detection of nucleic acid from SARS-CoV-2 in nasopharyngeal  swabs. Results are for the presumptive identification of SARS-CoV-2 RNA.    Positive results are indicative of infection with SARS-CoV-2, the virus  causing COVID-19, but do not rule out bacterial infection or co-infection  with other viruses. Laboratories within the [place] and its  territories are required to report all positive results to the appropriate  public health authorities. Negative results do not preclude SARS-CoV-2  infection and should not be used as the sole basis for treatment or other  patient management decisions. Negative results must be combined with  clinical observations, patient history, and epidemiological information.  This test has not been FDA cleared or approved.    This test has been authorized by FDA under an Emergency Use Authorization  (EUA). This test is only authorized for the duration of time the  declaration that circumstances exist justifying the authorization of the  emergency use of an in vitro diagnostic tests for detection of SARS-CoV-2  virus and/or  diagnosis of COVID-19 infection under section 564(b)(1) of  the Act, 21 U.S.C. 360bbb-3(b)(1), unless the authorization is terminated  or revoked sooner. The test has been validated but independent review by FDA  and CLIA is pending.    Test performed using [employer] GeneXpert: This RT-PCR assay targets N2,  a region unique to SARS-CoV-2. A conserved region in the E-gene was chosen  for pan-Sarbecovirus detection which includes SARS-CoV-2.    According to CMS-2020-01-R, this platform meets the definition of high-throughput technology.    Strep A PCR [[id number]]  (Normal) Collected: 04/06/24 0108    Lab Status: Final result Specimen: Throat Updated: 04/06/24 0145     STREP A PCR Not Detected                   No orders to display         Procedures  Procedures      ED Course         Counseled patient regarding case, right ear otitis media/URI, will treat with Augmentin for 7 days.  She will continue naproxen as well.  Patient with moderate relief after Toradol.  She is agreeable to plan and return precautions, otherwise will follow-up with her PCP.  Patient is hemodynamically stable, no acute distress.                              Medical Decision Making  DDx including but not limited to: Otitis media, otitis externa, bullous myringitis, perforated TM, impacted cerumen, dentalgia, dental abscess.  Doubt: Paulie's angina, mastoiditis, retropharyngeal abscess, peritonsillar abscess.    23-year-old female presented to ED with right ear pain, sore throat, right jaw pain beginning yesterday morning.  Child at home is also sick.  Took Tylenol with some relief yesterday.  She has also had an associated cough intermittently.  No measured fevers.  Past medical history consistent of asthma, varicella as a child.  Denies current pregnancy.  No dental pain, tongue swelling, airway involvement, drooling, abdominal pain, nausea/vomiting/diarrhea, focal weakness, headache.    Counseled patient regarding case, right ear otitis  media/URI, will treat with Augmentin for 7 days.  She will continue naproxen as well.  Patient with moderate relief after Toradol.  She is agreeable to plan and return precautions, otherwise will follow-up with her PCP.  Patient is hemodynamically stable, no acute distress.      Amount and/or Complexity of Data Reviewed  Labs: ordered.    Risk  Prescription drug management.          Disposition  Final diagnoses:   Right otitis media   URI (upper respiratory infection)     Time reflects when diagnosis was documented in both MDM as applicable and the Disposition within this note       Time User Action Codes Description Comment    4/6/2024  2:37 AM Thom Crenshaw Add [H66.91] Right otitis media     4/6/2024  2:38 AM Thom Crenshaw Add [J06.9] URI (upper respiratory infection)           ED Disposition       ED Disposition   Discharge    Condition   Stable    Date/Time   Sat Apr 6, 2024  2:37 AM    Comment   Yasmin Guido discharge to home/self care.                   Follow-up Information       Follow up With Specialties Details Why Contact Info Additional Information    [facility] Emergency Medicine  As needed [address] [place] [id number]  [phone] [facility], [facility] [place], [place], [address]            Discharge Medication List as of 4/6/2024  2:59 AM        START taking these medications    Details   amoxicillin-clavulanate (AUGMENTIN) 875-125 mg per tablet Take 1 tablet by mouth every 12 (twelve) hours for 7 days, Starting Sat 4/6/2024, Until Sat 4/13/2024, Normal      naproxen (Naprosyn) 500 mg tablet Take 1 tablet (500 mg total) by mouth 2 (two) times a day with meals, Starting Sat 4/6/2024, Normal           CONTINUE these medications which have NOT CHANGED    Details   acetaminophen (TYLENOL) 325 mg tablet Take 2 tablets (650 mg total) by mouth every 6 (six) hours as needed  for mild pain, headaches or fever, Starting Wed 9/6/2023, Normal      albuterol (ProAir HFA) 90 mcg/act inhaler Inhale 2 puffs every 6 (six) hours as needed for wheezing or shortness of breath, Starting Thu 8/24/2023, Normal      benzocaine-menthol-lanolin-aloe (DERMOPLAST) 20-0.5 % topical spray Apply 1 Application topically every 6 (six) hours as needed for mild pain, Starting Sun 9/24/2023, No Print      ibuprofen (MOTRIN) 600 mg tablet Take 1 tablet (600 mg total) by mouth every 6 (six) hours, Starting Sun 9/24/2023, No Print      Prenatal Vit-Fe Fumarate-FA (PRENATAL 1+1 PO) Take by mouth, Historical Med      witch hazel-glycerin (TUCKS) topical pad Apply 1 Pad topically every 4 (four) hours as needed for irritation, Starting Sun 9/24/2023, No Print           No discharge procedures on file.    PDMP Review       None             ED Provider  Attending physically available and evaluated Yasmin Guido. I managed the patient along with the ED Attending.    Electronically Signed by           Thom Crenshaw,   04/08/24 3222

## 2024-05-01 PROBLEM — Z01.419 ENCOUNTER FOR GYNECOLOGICAL EXAMINATION WITHOUT ABNORMAL FINDING: Status: RESOLVED | Noted: 2024-03-04 | Resolved: 2024-05-01

## 2024-06-27 ENCOUNTER — APPOINTMENT (EMERGENCY)
Dept: CT IMAGING | Facility: HOSPITAL | Age: 24
End: 2024-06-27
Payer: COMMERCIAL

## 2024-06-27 ENCOUNTER — HOSPITAL ENCOUNTER (EMERGENCY)
Facility: HOSPITAL | Age: 24
Discharge: HOME | End: 2024-06-28
Attending: EMERGENCY MEDICINE
Payer: COMMERCIAL

## 2024-06-27 VITALS
HEART RATE: 76 BPM | SYSTOLIC BLOOD PRESSURE: 145 MMHG | DIASTOLIC BLOOD PRESSURE: 91 MMHG | OXYGEN SATURATION: 99 % | TEMPERATURE: 97.7 F | RESPIRATION RATE: 18 BRPM

## 2024-06-27 DIAGNOSIS — J34.89 SINUS PAIN: ICD-10-CM

## 2024-06-27 DIAGNOSIS — K52.9 ENTERITIS: Primary | ICD-10-CM

## 2024-06-27 LAB
ALBUMIN SERPL BCG-MCNC: 4.7 G/DL (ref 3.5–5)
ALP SERPL-CCNC: 112 U/L (ref 34–104)
ALT SERPL W P-5'-P-CCNC: 20 U/L (ref 7–52)
ANION GAP SERPL CALCULATED.3IONS-SCNC: 8 MMOL/L (ref 4–13)
AST SERPL W P-5'-P-CCNC: 17 U/L (ref 13–39)
BASOPHILS # BLD AUTO: 0.04 THOUSANDS/ÂΜL (ref 0–0.1)
BASOPHILS NFR BLD AUTO: 0 % (ref 0–1)
BILIRUB SERPL-MCNC: 0.34 MG/DL (ref 0.2–1)
BILIRUB UR QL STRIP: NEGATIVE
BUN SERPL-MCNC: 14 MG/DL (ref 5–25)
CALCIUM SERPL-MCNC: 9.9 MG/DL (ref 8.4–10.2)
CARDIAC TROPONIN I PNL SERPL HS: <2 NG/L
CHLORIDE SERPL-SCNC: 105 MMOL/L (ref 96–108)
CK SERPL-CCNC: 94 U/L (ref 26–192)
CLARITY UR: CLEAR
CO2 SERPL-SCNC: 25 MMOL/L (ref 21–32)
COLOR UR: YELLOW
CREAT SERPL-MCNC: 0.69 MG/DL (ref 0.6–1.3)
EOSINOPHIL # BLD AUTO: 0.13 THOUSAND/ÂΜL (ref 0–0.61)
EOSINOPHIL NFR BLD AUTO: 1 % (ref 0–6)
ERYTHROCYTE [DISTWIDTH] IN BLOOD BY AUTOMATED COUNT: 13.7 % (ref 11.6–15.1)
EXT PREGNANCY TEST URINE: NEGATIVE
EXT. CONTROL: NORMAL
FLUAV RNA RESP QL NAA+PROBE: NEGATIVE
FLUBV RNA RESP QL NAA+PROBE: NEGATIVE
GFR SERPL CREATININE-BSD FRML MDRD: 122 ML/MIN/1.73SQ M
GLUCOSE SERPL-MCNC: 94 MG/DL (ref 65–140)
GLUCOSE UR STRIP-MCNC: NEGATIVE MG/DL
HCT VFR BLD AUTO: 41.1 % (ref 34.8–46.1)
HGB BLD-MCNC: 13.6 G/DL (ref 11.5–15.4)
HGB UR QL STRIP.AUTO: NEGATIVE
IMM GRANULOCYTES # BLD AUTO: 0.03 THOUSAND/UL (ref 0–0.2)
IMM GRANULOCYTES NFR BLD AUTO: 0 % (ref 0–2)
KETONES UR STRIP-MCNC: NEGATIVE MG/DL
LEUKOCYTE ESTERASE UR QL STRIP: NEGATIVE
LYMPHOCYTES # BLD AUTO: 3.17 THOUSANDS/ÂΜL (ref 0.6–4.47)
LYMPHOCYTES NFR BLD AUTO: 35 % (ref 14–44)
MAGNESIUM SERPL-MCNC: 2 MG/DL (ref 1.9–2.7)
MCH RBC QN AUTO: 28 PG (ref 26.8–34.3)
MCHC RBC AUTO-ENTMCNC: 33.1 G/DL (ref 31.4–37.4)
MCV RBC AUTO: 85 FL (ref 82–98)
MONOCYTES # BLD AUTO: 0.55 THOUSAND/ÂΜL (ref 0.17–1.22)
MONOCYTES NFR BLD AUTO: 6 % (ref 4–12)
NEUTROPHILS # BLD AUTO: 5.24 THOUSANDS/ÂΜL (ref 1.85–7.62)
NEUTS SEG NFR BLD AUTO: 58 % (ref 43–75)
NITRITE UR QL STRIP: NEGATIVE
NRBC BLD AUTO-RTO: 0 /100 WBCS
PH UR STRIP.AUTO: 6 [PH]
PHOSPHATE SERPL-MCNC: 5 MG/DL (ref 2.7–4.5)
PLATELET # BLD AUTO: 247 THOUSANDS/UL (ref 149–390)
PMV BLD AUTO: 11.8 FL (ref 8.9–12.7)
POTASSIUM SERPL-SCNC: 3.7 MMOL/L (ref 3.5–5.3)
PROT SERPL-MCNC: 7.7 G/DL (ref 6.4–8.4)
PROT UR STRIP-MCNC: NEGATIVE MG/DL
RBC # BLD AUTO: 4.86 MILLION/UL (ref 3.81–5.12)
RSV RNA RESP QL NAA+PROBE: NEGATIVE
SARS-COV-2 RNA RESP QL NAA+PROBE: NEGATIVE
SODIUM SERPL-SCNC: 138 MMOL/L (ref 135–147)
SP GR UR STRIP.AUTO: 1.03 (ref 1–1.03)
TSH SERPL DL<=0.05 MIU/L-ACNC: 2.74 UIU/ML (ref 0.45–4.5)
UROBILINOGEN UR STRIP-ACNC: <2 MG/DL
WBC # BLD AUTO: 9.16 THOUSAND/UL (ref 4.31–10.16)

## 2024-06-27 PROCEDURE — 96374 THER/PROPH/DIAG INJ IV PUSH: CPT

## 2024-06-27 PROCEDURE — 80053 COMPREHEN METABOLIC PANEL: CPT

## 2024-06-27 PROCEDURE — 81025 URINE PREGNANCY TEST: CPT

## 2024-06-27 PROCEDURE — 99285 EMERGENCY DEPT VISIT HI MDM: CPT | Performed by: EMERGENCY MEDICINE

## 2024-06-27 PROCEDURE — 83735 ASSAY OF MAGNESIUM: CPT

## 2024-06-27 PROCEDURE — 93005 ELECTROCARDIOGRAM TRACING: CPT

## 2024-06-27 PROCEDURE — 0241U HB NFCT DS VIR RESP RNA 4 TRGT: CPT

## 2024-06-27 PROCEDURE — 84484 ASSAY OF TROPONIN QUANT: CPT

## 2024-06-27 PROCEDURE — 82550 ASSAY OF CK (CPK): CPT

## 2024-06-27 PROCEDURE — 74177 CT ABD & PELVIS W/CONTRAST: CPT

## 2024-06-27 PROCEDURE — 85025 COMPLETE CBC W/AUTO DIFF WBC: CPT

## 2024-06-27 PROCEDURE — 99284 EMERGENCY DEPT VISIT MOD MDM: CPT

## 2024-06-27 PROCEDURE — 96361 HYDRATE IV INFUSION ADD-ON: CPT

## 2024-06-27 PROCEDURE — 36415 COLL VENOUS BLD VENIPUNCTURE: CPT

## 2024-06-27 PROCEDURE — 81003 URINALYSIS AUTO W/O SCOPE: CPT

## 2024-06-27 PROCEDURE — 84443 ASSAY THYROID STIM HORMONE: CPT

## 2024-06-27 PROCEDURE — 70450 CT HEAD/BRAIN W/O DYE: CPT

## 2024-06-27 PROCEDURE — 71260 CT THORAX DX C+: CPT

## 2024-06-27 PROCEDURE — 84100 ASSAY OF PHOSPHORUS: CPT

## 2024-06-27 RX ORDER — ACETAMINOPHEN 10 MG/ML
1000 INJECTION, SOLUTION INTRAVENOUS ONCE
Status: COMPLETED | OUTPATIENT
Start: 2024-06-27 | End: 2024-06-27

## 2024-06-27 RX ORDER — LIDOCAINE 50 MG/G
1 PATCH TOPICAL ONCE
Status: DISCONTINUED | OUTPATIENT
Start: 2024-06-27 | End: 2024-06-28 | Stop reason: HOSPADM

## 2024-06-27 RX ADMIN — SODIUM CHLORIDE 1000 ML: 0.9 INJECTION, SOLUTION INTRAVENOUS at 22:37

## 2024-06-27 RX ADMIN — ACETAMINOPHEN 1000 MG: 10 INJECTION INTRAVENOUS at 22:36

## 2024-06-27 RX ADMIN — LIDOCAINE 1 PATCH: 50 PATCH CUTANEOUS at 22:35

## 2024-06-28 LAB
ATRIAL RATE: 74 BPM
P AXIS: 62 DEGREES
PR INTERVAL: 158 MS
QRS AXIS: 65 DEGREES
QRSD INTERVAL: 74 MS
QT INTERVAL: 396 MS
QTC INTERVAL: 439 MS
T WAVE AXIS: 41 DEGREES
VENTRICULAR RATE: 74 BPM

## 2024-06-28 PROCEDURE — 93010 ELECTROCARDIOGRAM REPORT: CPT | Performed by: INTERNAL MEDICINE

## 2024-06-28 PROCEDURE — 96375 TX/PRO/DX INJ NEW DRUG ADDON: CPT

## 2024-06-28 RX ORDER — FAMOTIDINE 10 MG/ML
20 INJECTION, SOLUTION INTRAVENOUS ONCE
Status: COMPLETED | OUTPATIENT
Start: 2024-06-28 | End: 2024-06-28

## 2024-06-28 RX ORDER — KETOROLAC TROMETHAMINE 30 MG/ML
15 INJECTION, SOLUTION INTRAMUSCULAR; INTRAVENOUS ONCE
Status: COMPLETED | OUTPATIENT
Start: 2024-06-28 | End: 2024-06-28

## 2024-06-28 RX ORDER — FAMOTIDINE 20 MG/1
20 TABLET, FILM COATED ORAL 2 TIMES DAILY
Qty: 30 TABLET | Refills: 0 | Status: SHIPPED | OUTPATIENT
Start: 2024-06-28

## 2024-06-28 RX ADMIN — IOHEXOL 100 ML: 350 INJECTION, SOLUTION INTRAVENOUS at 00:28

## 2024-06-28 RX ADMIN — FAMOTIDINE 20 MG: 10 INJECTION INTRAVENOUS at 02:05

## 2024-06-28 RX ADMIN — KETOROLAC TROMETHAMINE 15 MG: 30 INJECTION, SOLUTION INTRAMUSCULAR; INTRAVENOUS at 02:05

## 2024-06-28 NOTE — DISCHARGE INSTRUCTIONS
Today we assessed you for your belly pain and eye burning.  We did a CT scan from your head to your pelvis.  The scan showed no acute pathology at this time.  Lab work was fairly unremarkable.  We treated you with Tylenol, Toradol, Pepcid.  Following the Toradol and Pepcid, you stated your pain had improved significantly.  At this time we strongly suggest you follow-up with your primary care provider within the next 24 to 48 hours to discuss further management and treatment.  Please return to the ED should you start having fevers, chills, nausea, vomiting.

## 2024-06-28 NOTE — ED PROVIDER NOTES
History  Chief Complaint   Patient presents with    Back Pain     Pt c/o R back pain since yesterday, tender to touch, and having L eye pain/dizziness that started this morning. +nausea      23-year-old female presenting to the ED with a complaint of right flank upper back pain with radiation to the epigastrium since yesterday.  Patient states that the pain was unbearable and she took 2 Tylenol but had mild relief.  Pain is radiating to both the groin and the epigastrium with no burning on urination.  Patient denies urinary complaints, no history of kidney stones, no traumas, and states she is not pregnant.  Patient states that the pain is a dull stabbing pain.  Patient also mentions that she has pressure/burning pain around her left eye.  Patient points to the left sinuses and states that she feels dizzy.  Patient states that this is exacerbated when she is leaning down and putting her head forward.  Patient's denying headache, lightheadedness, blurred vision, extraocular movement pain, eye pain, loss of vision.              Prior to Admission Medications   Prescriptions Last Dose Informant Patient Reported? Taking?   Prenatal Vit-Fe Fumarate-FA (PRENATAL 1+1 PO)  Self Yes No   Sig: Take by mouth   acetaminophen (TYLENOL) 325 mg tablet  Self No No   Sig: Take 2 tablets (650 mg total) by mouth every 6 (six) hours as needed for mild pain, headaches or fever   Patient not taking: Reported on 11/13/2023   albuterol (ProAir HFA) 90 mcg/act inhaler  Self No No   Sig: Inhale 2 puffs every 6 (six) hours as needed for wheezing or shortness of breath   Patient not taking: Reported on 9/13/2023   benzocaine-menthol-lanolin-aloe (DERMOPLAST) 20-0.5 % topical spray  Self No No   Sig: Apply 1 Application topically every 6 (six) hours as needed for mild pain   Patient not taking: Reported on 10/16/2023   ibuprofen (MOTRIN) 600 mg tablet  Self No No   Sig: Take 1 tablet (600 mg total) by mouth every 6 (six) hours   Patient not  taking: Reported on 10/16/2023   naproxen (Naprosyn) 500 mg tablet   No No   Sig: Take 1 tablet (500 mg total) by mouth 2 (two) times a day with meals   witch hazel-glycerin (TUCKS) topical pad  Self No No   Sig: Apply 1 Pad topically every 4 (four) hours as needed for irritation   Patient not taking: Reported on 10/16/2023      Facility-Administered Medications: None       Past Medical History:   Diagnosis Date    Asthma     as a child    Asthma during pregnancy 08/24/2023    Varicella     hx as a child       History reviewed. No pertinent surgical history.    Family History   Problem Relation Age of Onset    No Known Problems Mother     No Known Problems Father     No Known Problems Sister     No Known Problems Brother     No Known Problems Son     No Known Problems Maternal Grandmother     No Known Problems Maternal Grandfather     No Known Problems Paternal Grandmother     No Known Problems Paternal Grandfather      I have reviewed and agree with the history as documented.    E-Cigarette/Vaping    E-Cigarette Use Never User      E-Cigarette/Vaping Substances    Nicotine No     THC No     CBD No     Flavoring No     Other No     Unknown No      Social History     Tobacco Use    Smoking status: Never     Passive exposure: Past    Smokeless tobacco: Never   Vaping Use    Vaping status: Never Used   Substance Use Topics    Alcohol use: Not Currently    Drug use: Never        Review of Systems   Constitutional:  Negative for chills and fever.   HENT:  Positive for sinus pressure and sinus pain. Negative for congestion, sore throat and trouble swallowing.    Eyes:  Negative for photophobia, pain, discharge, redness and visual disturbance.        Patient mentions eye pain but when questioned deeper, states that the pain is around to the eye in the sinus region.   Respiratory:  Negative for chest tightness and shortness of breath.    Cardiovascular:  Negative for chest pain and palpitations.   Gastrointestinal:   Positive for abdominal pain, nausea and vomiting. Negative for diarrhea.   Endocrine: Negative for polyuria.   Genitourinary:  Negative for dysuria, hematuria, pelvic pain and vaginal discharge.   Musculoskeletal:  Positive for back pain. Negative for myalgias and neck pain.   Skin:  Negative for color change.   Neurological:  Positive for dizziness (With movement of the head due to the pain around her eye). Negative for weakness, light-headedness and headaches.   Psychiatric/Behavioral:  Negative for agitation.        Physical Exam  ED Triage Vitals   Temperature Pulse Respirations Blood Pressure SpO2   06/27/24 2204 06/27/24 2123 06/27/24 2123 06/27/24 2123 06/27/24 2123   97.7 °F (36.5 °C) 76 18 145/91 99 %      Temp Source Heart Rate Source Patient Position - Orthostatic VS BP Location FiO2 (%)   06/27/24 2204 06/27/24 2123 -- 06/27/24 2123 --   Oral Monitor  Right arm       Pain Score       06/27/24 2123       8             Orthostatic Vital Signs  Vitals:    06/27/24 2123   BP: 145/91   Pulse: 76       Physical Exam  Constitutional:       Appearance: Normal appearance.   HENT:      Head: Normocephalic and atraumatic.        Comments: Description of sinus pressure on the eyes.  Benign eye exam with full visual acuity.  No pain on extraocular movements.  Pain not worse with palpation or tapping of the temporal region.  No tinnitus or trismus.  No obvious abrasions or trauma.     Right Ear: Tympanic membrane, ear canal and external ear normal.      Left Ear: Tympanic membrane, ear canal and external ear normal.      Nose: Nose normal.      Mouth/Throat:      Mouth: Mucous membranes are moist.      Pharynx: Oropharynx is clear.   Eyes:      General: No scleral icterus.        Right eye: No discharge.         Left eye: No discharge.      Extraocular Movements: Extraocular movements intact.      Conjunctiva/sclera: Conjunctivae normal.      Pupils: Pupils are equal, round, and reactive to light.   Cardiovascular:       Rate and Rhythm: Normal rate and regular rhythm.      Pulses: Normal pulses.      Heart sounds: Normal heart sounds.   Pulmonary:      Effort: Pulmonary effort is normal.      Breath sounds: Normal breath sounds.   Abdominal:      General: Bowel sounds are normal.      Palpations: Abdomen is soft.      Tenderness: There is abdominal tenderness in the right upper quadrant, right lower quadrant, epigastric area and suprapubic area. There is right CVA tenderness. There is no left CVA tenderness, guarding or rebound. Negative signs include Nelson's sign and McBurney's sign.      Hernia: No hernia is present.       Musculoskeletal:         General: Normal range of motion.      Cervical back: Normal range of motion. No rigidity or tenderness.   Skin:     General: Skin is warm.      Capillary Refill: Capillary refill takes less than 2 seconds.   Neurological:      General: No focal deficit present.      Mental Status: She is alert and oriented to person, place, and time.   Psychiatric:         Mood and Affect: Mood normal.         Behavior: Behavior normal.         ED Medications  Medications   lidocaine (LIDODERM) 5 % patch 1 patch (1 patch Topical Medication Applied 6/27/24 2235)   sodium chloride 0.9 % bolus 1,000 mL (0 mL Intravenous Stopped 6/27/24 2337)   acetaminophen (Ofirmev) injection 1,000 mg (0 mg Intravenous Stopped 6/27/24 2251)   iohexol (OMNIPAQUE) 350 MG/ML injection (MULTI-DOSE) 100 mL (100 mL Intravenous Given 6/28/24 0028)   ketorolac (TORADOL) injection 15 mg (15 mg Intravenous Given 6/28/24 0205)   Famotidine (PF) (PEPCID) injection 20 mg (20 mg Intravenous Given 6/28/24 0205)       Diagnostic Studies  Results Reviewed       Procedure Component Value Units Date/Time    UA w Reflex to Microscopic w Reflex to Culture [[id number]]  (Abnormal) Collected: 06/27/24 2322    Lab Status: Final result Specimen: Urine, Clean Catch Updated: 06/27/24 2348     Color, UA Yellow     Clarity, UA Clear     Specific  Gravity, UA 1.034     pH, UA 6.0     Leukocytes, UA Negative     Nitrite, UA Negative     Protein, UA Negative mg/dl      Glucose, UA Negative mg/dl      Ketones, UA Negative mg/dl      Urobilinogen, UA <2.0 mg/dl      Bilirubin, UA Negative     Occult Blood, UA Negative    POCT pregnancy, urine [[id number]]  (Normal) Resulted: 06/27/24 2333    Lab Status: Final result Updated: 06/27/24 2333     EXT Preg Test, Ur Negative     Control Valid    TSH, 3rd generation with Free T4 reflex [[id number]]  (Normal) Collected: 06/27/24 2233    Lab Status: Final result Specimen: Blood from Arm, Right Updated: 06/27/24 2327     TSH 3RD GENERATON 2.740 uIU/mL     FLU/RSV/COVID - if FLU/RSV clinically relevant [[id number]]  (Normal) Collected: 06/27/24 2233    Lab Status: Final result Specimen: Nares from Nose Updated: 06/27/24 2324     SARS-CoV-2 Negative     INFLUENZA A PCR Negative     INFLUENZA B PCR Negative     RSV PCR Negative    Narrative:      FOR PEDIATRIC PATIENTS - copy/paste COVID Guidelines URL to browser: [email or web address]    SARS-CoV-2 assay is a Nucleic Acid Amplification assay intended for the  qualitative detection of nucleic acid from SARS-CoV-2 in nasopharyngeal  swabs. Results are for the presumptive identification of SARS-CoV-2 RNA.    Positive results are indicative of infection with SARS-CoV-2, the virus  causing COVID-19, but do not rule out bacterial infection or co-infection  with other viruses. Laboratories within the [place] and its  territories are required to report all positive results to the appropriate  public health authorities. Negative results do not preclude SARS-CoV-2  infection and should not be used as the sole basis for treatment or other  patient management decisions. Negative results must be combined with  clinical observations, patient history, and epidemiological information.  This test has not been FDA cleared or  approved.    This test has been authorized by FDA under an Emergency Use Authorization  (EUA). This test is only authorized for the duration of time the  declaration that circumstances exist justifying the authorization of the  emergency use of an in vitro diagnostic tests for detection of SARS-CoV-2  virus and/or diagnosis of COVID-19 infection under section 564(b)(1) of  the Act, 21 U.S.C. 360bbb-3(b)(1), unless the authorization is terminated  or revoked sooner. The test has been validated but independent review by FDA  and CLIA is pending.    Test performed using [employer] GeneXpert: This RT-PCR assay targets N2,  a region unique to SARS-CoV-2. A conserved region in the E-gene was chosen  for pan-Sarbecovirus detection which includes SARS-CoV-2.    According to CMS-2020-01-R, this platform meets the definition of high-throughput technology.    HS Troponin 0hr (reflex protocol) [[id number]]  (Normal) Collected: 06/27/24 2233    Lab Status: Final result Specimen: Blood from Arm, Right Updated: 06/27/24 2308     hs TnI 0hr <2 ng/L     Comprehensive metabolic panel [[id number]]  (Abnormal) Collected: 06/27/24 2233    Lab Status: Final result Specimen: Blood from Arm, Right Updated: 06/27/24 2303     Sodium 138 mmol/L      Potassium 3.7 mmol/L      Chloride 105 mmol/L      CO2 25 mmol/L      ANION GAP 8 mmol/L      BUN 14 mg/dL      Creatinine 0.69 mg/dL      Glucose 94 mg/dL      Calcium 9.9 mg/dL      AST 17 U/L      ALT 20 U/L      Alkaline Phosphatase 112 U/L      Total Protein 7.7 g/dL      Albumin 4.7 g/dL      Total Bilirubin 0.34 mg/dL      eGFR 122 ml/min/1.73sq m     Narrative:      National Kidney Disease Foundation guidelines for Chronic Kidney Disease (CKD):     Stage 1 with normal or high GFR (GFR > 90 mL/min/1.73 square meters)    Stage 2 Mild CKD (GFR = 60-89 mL/min/1.73 square meters)    Stage 3A Moderate CKD (GFR = 45-59 mL/min/1.73 square meters)    Stage 3B Moderate CKD (GFR = 30-44 mL/min/1.73 square  meters)    Stage 4 Severe CKD (GFR = 15-29 mL/min/1.73 square meters)    Stage 5 End Stage CKD (GFR <15 mL/min/1.73 square meters)  Note: GFR calculation is accurate only with a steady state creatinine    CK [[id number]]  (Normal) Collected: 06/27/24 2233    Lab Status: Final result Specimen: Blood from Arm, Right Updated: 06/27/24 2303     Total CK 94 U/L     Magnesium [[id number]]  (Normal) Collected: 06/27/24 2233    Lab Status: Final result Specimen: Blood from Arm, Right Updated: 06/27/24 2303     Magnesium 2.0 mg/dL     Phosphorus [[id number]]  (Abnormal) Collected: 06/27/24 2233    Lab Status: Final result Specimen: Blood from Arm, Right Updated: 06/27/24 2303     Phosphorus 5.0 mg/dL     CBC and differential [[id number]] Collected: 06/27/24 2233    Lab Status: Final result Specimen: Blood from Arm, Right Updated: 06/27/24 2254     WBC 9.16 Thousand/uL      RBC 4.86 Million/uL      Hemoglobin 13.6 g/dL      Hematocrit 41.1 %      MCV 85 fL      MCH 28.0 pg      MCHC 33.1 g/dL      RDW 13.7 %      MPV 11.8 fL      Platelets 247 Thousands/uL      nRBC 0 /100 WBCs      Segmented % 58 %      Immature Grans % 0 %      Lymphocytes % 35 %      Monocytes % 6 %      Eosinophils Relative 1 %      Basophils Relative 0 %      Absolute Neutrophils 5.24 Thousands/µL      Absolute Immature Grans 0.03 Thousand/uL      Absolute Lymphocytes 3.17 Thousands/µL      Absolute Monocytes 0.55 Thousand/µL      Eosinophils Absolute 0.13 Thousand/µL      Basophils Absolute 0.04 Thousands/µL                    CT head wo contrast   Final Result by Alexi Carcamo MD (06/28 0106)      No acute intracranial abnormality.                  Workstation performed: [id number]         CT chest abdomen pelvis w contrast   Final Result by Alexi Carcamo MD (06/28 0119)      No acute findings in the chest, abdomen or pelvis.      There are a few loops of nondilated fluid-filled small bowel at the mid abdomen which may be secondary to very  gastroenteritis. No evidence of large or small bowel obstruction.      No hydronephrosis.         Workstation performed: [id number]         CT recon only thoracolumbar (No Charge)   Final Result by Alexi Carcamo MD (06/28 0121)      No fracture or traumatic subluxation.               Workstation performed: [id number]               Procedures  ECG 12 Lead Documentation Only    Date/Time: 6/28/2024 3:44 AM    Performed by: Chad Gay MD  Authorized by: Chad Gay MD    Indications / Diagnosis:  Dizziness  ECG reviewed by me, the ED Provider: yes    Patient location:  ED  Previous ECG:     Previous ECG:  Compared to current    Similarity:  Changes noted  Interpretation:     Interpretation: normal    Rate:     ECG rate:  74    ECG rate assessment: normal    Rhythm:     Rhythm: sinus rhythm    Ectopy:     Ectopy: none    QRS:     QRS axis:  Normal    QRS intervals:  Normal  Conduction:     Conduction: normal    ST segments:     ST segments:  Normal  T waves:     T waves: normal          ED Course  ED Course as of 06/28/24 0352   Fri Jun 28, 2024   0015 PREGNANCY TEST URINE: Negative   0021 FLU/RSV/COVID - if FLU/RSV clinically relevant  Negative and reassuring   0022 TSH 3RD GENERATON: 2.740  WNL   0022 hs TnI 0hr: <2  WNL reassuring   0022 Phosphorus(!): 5.0  Elevated, will monitor patient for changes   0022 CBC and differential  WNL, unremarkable and reassuring   0024 MAGNESIUM: 2.0  normal   0024 Total CK: 94  normal   0122 IMPRESSION:     No acute findings in the chest, abdomen or pelvis.     There are a few loops of nondilated fluid-filled small bowel at the mid abdomen which may be secondary to very gastroenteritis. No evidence of large or small bowel obstruction.     No hydronephrosis.        Workstation performed: [id number]        0122 IMPRESSION:     No acute intracranial abnormality.                 Workstation performed: [id number]     0124 IMPRESSION:     No fracture or traumatic  subluxation.              Workstation performed: [id number]     0141 Reassessed patient, still symptomatic, will try toradol and pepcid.             HEART Risk Score      Flowsheet Row Most Recent Value   Heart Score Risk Calculator    History 0 Filed at: 06/28/2024 0345   ECG 0 Filed at: 06/28/2024 0345   Age 0 Filed at: 06/28/2024 0345   Risk Factors 0 Filed at: 06/28/2024 0345   Troponin 0 Filed at: 06/28/2024 0345   HEART Score 0 Filed at: 06/28/2024 0345                PERC Rule for PE      Flowsheet Row Most Recent Value   PERC Rule for PE    Age >=50 0 Filed at: 06/27/2024 2225   HR >=100 0 Filed at: 06/27/2024 2225   O2 Sat on room air < 95% 0 Filed at: 06/27/2024 2225   History of PE or DVT 0 Filed at: 06/27/2024 2225   Recent trauma or surgery 0 Filed at: 06/27/2024 2225   Hemoptysis 0 Filed at: 06/27/2024 2225   Exogenous estrogen 0 Filed at: 06/27/2024 2225   Unilateral leg swelling 0 Filed at: 06/27/2024 2225   PERC Rule for PE Results 0 Filed at: 06/27/2024 2225                    Wells' Criteria for PE      Flowsheet Row Most Recent Value   Wells' Criteria for PE    Clinical signs and symptoms of DVT 0 Filed at: 06/27/2024 2225   PE is primary diagnosis or equally likely 0 Filed at: 06/27/2024 2225   HR >100 0 Filed at: 06/27/2024 2225   Immobilization at least 3 days or Surgery in the previous 4 weeks 0 Filed at: 06/27/2024 2225   Previous, objectively diagnosed PE or DVT 0 Filed at: 06/27/2024 2225   Hemoptysis 0 Filed at: 06/27/2024 2225   Malignancy with treatment within 6 months or palliative 0 Filed at: 06/27/2024 2225   Wells' Criteria Total 0 Filed at: 06/27/2024 2225              Medical Decision Making  23-year-old female presenting to the ED with complaint of back pain to flank pain as well as sinus pressure and head.    DDx including but not limited to: appendicitis, gastroenteritis, gastritis, PUD, GERD, gastroparesis, hepatitis, pancreatitis, colitis, enteritis, food poisoning,  mesenteric adenitis, epiploic appendagitis, IBD, IBS, ileus, bowel obstruction, volvulus, cholecystitis, biliary colic, choledocholithiasis, perforated viscus, tumor, splenic etiology, diverticulitis, internal hernia, constipation, pelvic pathology, renal colic, pyelonephritis, UTI.DDx including but not limited to: herniated disc, arthritis, spinal stenosis, strain, sprain, doubt PE (PERC negative), PTX, pneumonia, rhabdomyolysis; doubt fracture, epidural abscess, dissection or cardiac etiology.  In regards to the eye pressure, DDx including but not limited to: parotitis, sinusitis, dental abscess, angioedema, cellulitis, abscess, phlegmon, trigeminal neuralgia, TMJ syndrome, sialoadenitis, mumps; doubt cavernous sinus thrombosis or SVC syndrome.      Will check labs, EKG, and CTs. see ED course for interpretation of labs.  Labs are fairly unremarkable other than a mildly elevated phosphorus which patient showed no symptomatic changes or EKG changes.    CT scans did show a mid abdomen gastroenteritis.    Patient was treated with IV fluids, lidocaine patch, IV Tylenol, famotidine, Toradol.  Patient was continuously reassessed while here in the ED and following Toradol and Pepcid, patient stated that she had resolution of symptoms and was comfortable with going home.    Patient was counseled on following up with primary care provider tomorrow for follow-up within 12 hours.  Patient states she will call primary care provider in the morning when she wakes up.  Patient given an outpatient prescription for Pepcid and counseled on use of Tylenol and ibuprofen for continued pain management outpatient.  On discharge, patient's vitals were within normal limits, patient was well-appearing and able to ambulate well and had no complaint of pain or acute distress.  Patient discharged at this time.      Amount and/or Complexity of Data Reviewed  Labs: ordered. Decision-making details documented in ED Course.  Radiology:  ordered.    Risk  Prescription drug management.          Disposition  Final diagnoses:   Enteritis   Sinus pain     Time reflects when diagnosis was documented in both MDM as applicable and the Disposition within this note       Time User Action Codes Description Comment    6/28/2024  1:25 AM Chad Gallo [K52.9] Enteritis     6/28/2024  3:21 AM Chad Gallo [J34.89] Sinus pain           ED Disposition       ED Disposition   Discharge    Condition   Stable    Date/Time   Fri Jun 28, 2024 0323    Comment   Yasmin Marcosunez discharge to home/self care.                   Follow-up Information       Follow up With Specialties Details Why Contact Info Additional Information    [facility] Medicine Call   [address]  [address]  [facility] [address] [phone] [facility], [address], [address], [place] [address] [phone]            Discharge Medication List as of 6/28/2024  3:23 AM        START taking these medications    Details   famotidine (PEPCID) 20 mg tablet Take 1 tablet (20 mg total) by mouth 2 (two) times a day, Starting Fri 6/28/2024, Normal           CONTINUE these medications which have NOT CHANGED    Details   acetaminophen (TYLENOL) 325 mg tablet Take 2 tablets (650 mg total) by mouth every 6 (six) hours as needed for mild pain, headaches or fever, Starting Wed 9/6/2023, Normal      albuterol (ProAir HFA) 90 mcg/act inhaler Inhale 2 puffs every 6 (six) hours as needed for wheezing or shortness of breath, Starting Thu 8/24/2023, Normal      benzocaine-menthol-lanolin-aloe (DERMOPLAST) 20-0.5 % topical spray Apply 1 Application topically every 6 (six) hours as needed for mild pain, Starting Sun 9/24/2023, No Print      ibuprofen (MOTRIN) 600 mg tablet Take 1 tablet (600 mg total) by mouth every 6 (six) hours, Starting Sun 9/24/2023, No Print      naproxen (Naprosyn) 500 mg tablet Take 1 tablet (500 mg  total) by mouth 2 (two) times a day with meals, Starting Sat 4/6/2024, Normal      Prenatal Vit-Fe Fumarate-FA (PRENATAL 1+1 PO) Take by mouth, Historical Med      witch hazel-glycerin (TUCKS) topical pad Apply 1 Pad topically every 4 (four) hours as needed for irritation, Starting Sun 9/24/2023, No Print           No discharge procedures on file.    PDMP Review         Value Time User    PDMP Reviewed  Yes 6/27/2024  9:57 PM Lamont Carlisle MD             ED Provider  Attending physically available and evaluated Yasmin Guido. I managed the patient along with the ED Attending.    Electronically Signed by           Chad Gay MD  06/28/24 0352       Chad Gay MD  06/28/24 0352

## 2024-06-28 NOTE — ED ATTENDING ATTESTATION
6/27/2024  I, Lamont Carlisle MD, saw and evaluated the patient. I have discussed the patient with the resident/non-physician practitioner and agree with the resident's/non-physician practitioner's findings, Plan of Care, and MDM as documented in the resident's/non-physician practitioner's note, except where noted. All available labs and Radiology studies were reviewed.  I was present for key portions of any procedure(s) performed by the resident/non-physician practitioner and I was immediately available to provide assistance.       At this point I agree with the current assessment done in the Emergency Department.  I have conducted an independent evaluation of this patient a history and physical is as follows: Patient is a 23 year old female with right flank and upper back pain with radiation to the abdomen since yesterday with headache with N/V. No diarrhea. No fever. No urinary sx. No abdominal surgery. Was last seen in this ED on 4/6/24 for R OM and URI. [facility] website checked on this patient and no Rx found. NCAT. PERRL. No scleral icterus.  EOMI. Moist mucous membranes. Lungs clear. Heart regular without murmur. Abdomen with RUQ tenderness. Good bowel sounds. No acute abdomen. (+) R CVAT. (+) midline thoracic back tenderness. No edema. No rash noted. No jaundice. No focal deficits. (+) breast feeding infant. DDx including but not limited to: appendicitis, gastroenteritis, gastritis, PUD, GERD, gastroparesis, hepatitis, pancreatitis, colitis, enteritis, food poisoning, mesenteric adenitis, epiploic appendagitis, IBD, IBS, ileus, bowel obstruction, volvulus, cholecystitis, biliary colic, choledocholithiasis, perforated viscus, tumor, splenic etiology, diverticulitis, internal hernia, constipation, pelvic pathology, renal colic, pyelonephritis, UTI.DDx including but not limited to: herniated disc, arthritis, spinal stenosis, strain, sprain, doubt PE (PERC negative), PTX, pneumonia, rhabdomyolysis; doubt  fracture, epidural abscess, dissection or cardiac etiology. Will check labs, EKG, and CTs.     ED Course         Critical Care Time  Procedures